# Patient Record
Sex: FEMALE | Race: WHITE | NOT HISPANIC OR LATINO | Employment: STUDENT | URBAN - METROPOLITAN AREA
[De-identification: names, ages, dates, MRNs, and addresses within clinical notes are randomized per-mention and may not be internally consistent; named-entity substitution may affect disease eponyms.]

---

## 2020-07-14 DIAGNOSIS — N94.6 DYSMENORRHEA: Primary | ICD-10-CM

## 2020-09-10 ENCOUNTER — OFFICE VISIT (OUTPATIENT)
Dept: OBSTETRICS AND GYNECOLOGY | Facility: CLINIC | Age: 20
End: 2020-09-10
Payer: COMMERCIAL

## 2020-09-10 VITALS
HEART RATE: 80 BPM | HEIGHT: 69 IN | DIASTOLIC BLOOD PRESSURE: 74 MMHG | SYSTOLIC BLOOD PRESSURE: 113 MMHG | BODY MASS INDEX: 25.33 KG/M2 | WEIGHT: 171 LBS

## 2020-09-10 DIAGNOSIS — N94.6 DYSMENORRHEA: ICD-10-CM

## 2020-09-10 DIAGNOSIS — Z01.419 WOMEN'S ANNUAL ROUTINE GYNECOLOGICAL EXAMINATION: Primary | ICD-10-CM

## 2020-09-10 PROCEDURE — 99385 PREV VISIT NEW AGE 18-39: CPT | Mod: S$GLB,,, | Performed by: OBSTETRICS & GYNECOLOGY

## 2020-09-10 PROCEDURE — 99385 PR PREVENTIVE VISIT,NEW,18-39: ICD-10-PCS | Mod: S$GLB,,, | Performed by: OBSTETRICS & GYNECOLOGY

## 2020-09-10 RX ORDER — ELUXADOLINE 100 MG/1
1 TABLET, FILM COATED ORAL EVERY MORNING
COMMUNITY
Start: 2020-08-04 | End: 2022-01-04 | Stop reason: SDUPTHER

## 2020-09-10 RX ORDER — ESCITALOPRAM OXALATE 10 MG/1
10 TABLET ORAL DAILY
COMMUNITY
Start: 2020-07-13 | End: 2021-09-02

## 2020-09-10 RX ORDER — DESOGESTREL AND ETHINYL ESTRADIOL 0.15-0.03
1 KIT ORAL DAILY
COMMUNITY
Start: 2020-08-13 | End: 2020-09-10

## 2020-09-10 RX ORDER — LEVONORGESTREL AND ETHINYL ESTRADIOL 0.1-0.02MG
1 KIT ORAL DAILY
Qty: 28 TABLET | Refills: 11 | Status: SHIPPED | OUTPATIENT
Start: 2020-09-10 | End: 2021-09-02

## 2020-09-10 NOTE — PROGRESS NOTES
Subjective:       Patient ID: Sophia Canseco is a 19 y.o. female.    Chief Complaint:  Well Woman, Gynecologic Exam, and Contraception      History of Present Illness  Pt here for gyn annual.  History and past labs reviewed with patient.    Complaints dysmenorrhea with cycle. Also notes that cycle has moved to the active pills of her pack. Wants to discuss changing contraception       Past Medical History:   Diagnosis Date    Anxiety     IBS (irritable bowel syndrome)        Past Surgical History:   Procedure Laterality Date    COLONOSCOPY         OB:    Gyn: no STD, never had abn pap   Meds:   Current Outpatient Medications:     ENSKYCE 0.15-0.03 mg per tablet, Take 1 tablet by mouth once daily., Disp: , Rfl:     escitalopram oxalate (LEXAPRO) 10 MG tablet, Take 10 mg by mouth once daily., Disp: , Rfl:     VIBERZI 100 mg Tab, Take 1 tablet by mouth every morning., Disp: , Rfl:     All: Review of patient's allergies indicates:  No Known Allergies    SH:   Social History     Tobacco Use    Smoking status: Never Smoker    Smokeless tobacco: Never Used   Substance Use Topics    Alcohol use: Yes     Frequency: 2-4 times a month      FH: family history includes Diabetes in her paternal grandfather.          Review of Systems  Review of Systems   Constitutional: Negative for chills and fever.   Respiratory: Negative for shortness of breath.    Cardiovascular: Negative for chest pain.   Gastrointestinal: Negative for abdominal pain, blood in stool, constipation, diarrhea, nausea, vomiting and reflux.   Genitourinary: Positive for dysmenorrhea. Negative for dyspareunia, dysuria, hematuria, hot flashes, menorrhagia, menstrual problem, pelvic pain, vaginal bleeding, vaginal discharge, postcoital bleeding and vaginal dryness.   Musculoskeletal: Negative for arthralgias and joint swelling.   Integumentary:  Negative for rash, hair changes, breast mass, nipple discharge and breast skin changes.  "  Psychiatric/Behavioral: Negative for depression. The patient is not nervous/anxious.    Breast: Negative for asymmetry, lump, mass, nipple discharge and skin changes          Objective:     Vitals:    09/10/20 1321   BP: 113/74   Pulse: 80   Weight: 77.6 kg (171 lb)   Height: 5' 9" (1.753 m)       Physical Exam:   Constitutional: She appears well-developed and well-nourished. No distress.    HENT:   Head: Normocephalic and atraumatic.    Eyes: Conjunctivae and EOM are normal.    Neck: No tracheal deviation present. No thyromegaly present.    Cardiovascular: Exam reveals no clubbing, no cyanosis and no edema.     Pulmonary/Chest: Effort normal. No respiratory distress.        Abdominal: Soft. She exhibits no distension and no mass. There is no abdominal tenderness. There is no rebound and no guarding. No hernia.     Genitourinary:    Vagina, uterus and rectum normal.      Pelvic exam was performed with patient supine.   There is no rash, tenderness, lesion or injury on the right labia. There is no rash, tenderness, lesion or injury on the left labia. Cervix is normal. Right adnexum displays no mass, no tenderness and no fullness. Left adnexum displays no mass, no tenderness and no fullness.                Skin: She is not diaphoretic. No cyanosis. Nails show no clubbing.          Assessment:        1. Women's annual routine gynecological examination                Plan:      Annual   Pap today   STD panel requested   Switch to balcoltra due to progesterone type   Risk assessment for inherited gyn cancer done - neg   RTC  1 year         "

## 2020-09-10 NOTE — LETTER
September 10, 2020      Antonina Sinha MD  [address]  [address]  [place] [address]           [facility]  [address]  [place] [address]  Phone: [phone]  Fax: [phone]          Patient: Sophia Canseco   MR Number: [id number]   YOB: 2000   Date of Visit: 9/10/2020       Dear Dr. Antonina Sinha:    Thank you for referring Sophia Canseco to me for evaluation. Attached you will find relevant portions of my assessment and plan of care.    If you have questions, please do not hesitate to call me. I look forward to following Sophia Canseco along with you.    Sincerely,    Gemma Graves MD    Enclosure  CC:  No Recipients    If you would like to receive this communication electronically, please contact [email or web address] or [phone] to request more information on [facility] [facility] access.    For providers and/or their staff who would like to refer a patient to [facility], please contact us through our one-stop-shop provider referral line, [facility], at [phone].    If you feel you have received this communication in error or would no longer like to receive these types of communications, please e-mail [email or web address]

## 2021-04-27 ENCOUNTER — TELEPHONE (OUTPATIENT)
Dept: OBSTETRICS AND GYNECOLOGY | Facility: CLINIC | Age: 21
End: 2021-04-27

## 2021-09-02 ENCOUNTER — OFFICE VISIT (OUTPATIENT)
Dept: OBSTETRICS AND GYNECOLOGY | Facility: CLINIC | Age: 21
End: 2021-09-02
Payer: COMMERCIAL

## 2021-09-02 VITALS
WEIGHT: 181.81 LBS | BODY MASS INDEX: 26.93 KG/M2 | HEIGHT: 69 IN | HEART RATE: 91 BPM | SYSTOLIC BLOOD PRESSURE: 109 MMHG | DIASTOLIC BLOOD PRESSURE: 73 MMHG

## 2021-09-02 DIAGNOSIS — N94.6 DYSMENORRHEA: Primary | ICD-10-CM

## 2021-09-02 DIAGNOSIS — Z30.011 ENCOUNTER FOR PRESCRIPTION OF ORAL CONTRACEPTIVES: ICD-10-CM

## 2021-09-02 PROCEDURE — 99213 PR OFFICE/OUTPT VISIT, EST, LEVL III, 20-29 MIN: ICD-10-PCS | Mod: S$GLB,,, | Performed by: OBSTETRICS & GYNECOLOGY

## 2021-09-02 PROCEDURE — 3008F BODY MASS INDEX DOCD: CPT | Mod: CPTII,S$GLB,, | Performed by: OBSTETRICS & GYNECOLOGY

## 2021-09-02 PROCEDURE — 99213 OFFICE O/P EST LOW 20 MIN: CPT | Mod: S$GLB,,, | Performed by: OBSTETRICS & GYNECOLOGY

## 2021-09-02 PROCEDURE — 1159F PR MEDICATION LIST DOCUMENTED IN MEDICAL RECORD: ICD-10-PCS | Mod: CPTII,S$GLB,, | Performed by: OBSTETRICS & GYNECOLOGY

## 2021-09-02 PROCEDURE — 3074F SYST BP LT 130 MM HG: CPT | Mod: CPTII,S$GLB,, | Performed by: OBSTETRICS & GYNECOLOGY

## 2021-09-02 PROCEDURE — 3078F DIAST BP <80 MM HG: CPT | Mod: CPTII,S$GLB,, | Performed by: OBSTETRICS & GYNECOLOGY

## 2021-09-02 PROCEDURE — 1159F MED LIST DOCD IN RCRD: CPT | Mod: CPTII,S$GLB,, | Performed by: OBSTETRICS & GYNECOLOGY

## 2021-09-02 PROCEDURE — 3078F PR MOST RECENT DIASTOLIC BLOOD PRESSURE < 80 MM HG: ICD-10-PCS | Mod: CPTII,S$GLB,, | Performed by: OBSTETRICS & GYNECOLOGY

## 2021-09-02 PROCEDURE — 3008F PR BODY MASS INDEX (BMI) DOCUMENTED: ICD-10-PCS | Mod: CPTII,S$GLB,, | Performed by: OBSTETRICS & GYNECOLOGY

## 2021-09-02 PROCEDURE — 3074F PR MOST RECENT SYSTOLIC BLOOD PRESSURE < 130 MM HG: ICD-10-PCS | Mod: CPTII,S$GLB,, | Performed by: OBSTETRICS & GYNECOLOGY

## 2021-09-02 RX ORDER — DROSPIRENONE AND ETHINYL ESTRADIOL 0.02-3(28)
1 KIT ORAL DAILY
Qty: 30 TABLET | Refills: 11 | Status: SHIPPED | OUTPATIENT
Start: 2021-09-02 | End: 2021-12-03 | Stop reason: SDUPTHER

## 2021-12-03 ENCOUNTER — OFFICE VISIT (OUTPATIENT)
Dept: OBSTETRICS AND GYNECOLOGY | Facility: CLINIC | Age: 21
End: 2021-12-03
Payer: COMMERCIAL

## 2021-12-03 VITALS
BODY MASS INDEX: 25.84 KG/M2 | HEART RATE: 86 BPM | DIASTOLIC BLOOD PRESSURE: 72 MMHG | SYSTOLIC BLOOD PRESSURE: 109 MMHG | WEIGHT: 175 LBS

## 2021-12-03 DIAGNOSIS — Z01.419 WOMEN'S ANNUAL ROUTINE GYNECOLOGICAL EXAMINATION: Primary | ICD-10-CM

## 2021-12-03 DIAGNOSIS — N94.6 DYSMENORRHEA: ICD-10-CM

## 2021-12-03 PROCEDURE — 99395 PREV VISIT EST AGE 18-39: CPT | Mod: S$GLB,,, | Performed by: OBSTETRICS & GYNECOLOGY

## 2021-12-03 PROCEDURE — 99395 PR PREVENTIVE VISIT,EST,18-39: ICD-10-PCS | Mod: S$GLB,,, | Performed by: OBSTETRICS & GYNECOLOGY

## 2021-12-03 RX ORDER — FLUOXETINE 10 MG/1
CAPSULE ORAL
COMMUNITY
Start: 2021-10-25

## 2021-12-03 RX ORDER — DICYCLOMINE HYDROCHLORIDE 10 MG/1
CAPSULE ORAL
COMMUNITY
Start: 2021-11-19 | End: 2022-04-13 | Stop reason: SDUPTHER

## 2021-12-03 RX ORDER — PROPRANOLOL HYDROCHLORIDE 10 MG/1
TABLET ORAL
COMMUNITY
Start: 2021-10-01

## 2021-12-03 RX ORDER — DROSPIRENONE AND ETHINYL ESTRADIOL 0.02-3(28)
1 KIT ORAL DAILY
Qty: 30 TABLET | Refills: 11 | Status: SHIPPED | OUTPATIENT
Start: 2021-12-03 | End: 2023-01-18 | Stop reason: SDUPTHER

## 2022-01-04 ENCOUNTER — OFFICE VISIT (OUTPATIENT)
Dept: GASTROENTEROLOGY | Facility: CLINIC | Age: 22
End: 2022-01-04
Payer: COMMERCIAL

## 2022-01-04 VITALS
TEMPERATURE: 99 F | OXYGEN SATURATION: 98 % | DIASTOLIC BLOOD PRESSURE: 84 MMHG | WEIGHT: 179.38 LBS | HEIGHT: 70 IN | SYSTOLIC BLOOD PRESSURE: 126 MMHG | HEART RATE: 84 BPM | BODY MASS INDEX: 25.68 KG/M2

## 2022-01-04 DIAGNOSIS — K58.0 IRRITABLE BOWEL SYNDROME WITH DIARRHEA: Primary | ICD-10-CM

## 2022-01-04 PROCEDURE — 99203 PR OFFICE/OUTPT VISIT, NEW, LEVL III, 30-44 MIN: ICD-10-PCS | Mod: S$GLB,,, | Performed by: INTERNAL MEDICINE

## 2022-01-04 PROCEDURE — 3008F BODY MASS INDEX DOCD: CPT | Mod: CPTII,S$GLB,, | Performed by: INTERNAL MEDICINE

## 2022-01-04 PROCEDURE — 3008F PR BODY MASS INDEX (BMI) DOCUMENTED: ICD-10-PCS | Mod: CPTII,S$GLB,, | Performed by: INTERNAL MEDICINE

## 2022-01-04 PROCEDURE — 1159F PR MEDICATION LIST DOCUMENTED IN MEDICAL RECORD: ICD-10-PCS | Mod: CPTII,S$GLB,, | Performed by: INTERNAL MEDICINE

## 2022-01-04 PROCEDURE — 3079F DIAST BP 80-89 MM HG: CPT | Mod: CPTII,S$GLB,, | Performed by: INTERNAL MEDICINE

## 2022-01-04 PROCEDURE — 3079F PR MOST RECENT DIASTOLIC BLOOD PRESSURE 80-89 MM HG: ICD-10-PCS | Mod: CPTII,S$GLB,, | Performed by: INTERNAL MEDICINE

## 2022-01-04 PROCEDURE — 1160F PR REVIEW ALL MEDS BY PRESCRIBER/CLIN PHARMACIST DOCUMENTED: ICD-10-PCS | Mod: CPTII,S$GLB,, | Performed by: INTERNAL MEDICINE

## 2022-01-04 PROCEDURE — 3074F SYST BP LT 130 MM HG: CPT | Mod: CPTII,S$GLB,, | Performed by: INTERNAL MEDICINE

## 2022-01-04 PROCEDURE — 1159F MED LIST DOCD IN RCRD: CPT | Mod: CPTII,S$GLB,, | Performed by: INTERNAL MEDICINE

## 2022-01-04 PROCEDURE — 3074F PR MOST RECENT SYSTOLIC BLOOD PRESSURE < 130 MM HG: ICD-10-PCS | Mod: CPTII,S$GLB,, | Performed by: INTERNAL MEDICINE

## 2022-01-04 PROCEDURE — 1160F RVW MEDS BY RX/DR IN RCRD: CPT | Mod: CPTII,S$GLB,, | Performed by: INTERNAL MEDICINE

## 2022-01-04 PROCEDURE — 99203 OFFICE O/P NEW LOW 30 MIN: CPT | Mod: S$GLB,,, | Performed by: INTERNAL MEDICINE

## 2022-01-04 RX ORDER — SPIRONOLACTONE 50 MG/1
1 TABLET, FILM COATED ORAL DAILY
COMMUNITY
Start: 2021-12-29

## 2022-01-04 RX ORDER — ELUXADOLINE 100 MG/1
1 TABLET, FILM COATED ORAL EVERY MORNING
Qty: 90 TABLET | Refills: 4 | Status: SHIPPED | OUTPATIENT
Start: 2022-01-04 | End: 2022-01-24 | Stop reason: SDUPTHER

## 2022-01-04 RX ORDER — TRETINOIN 0.6 MG/G
1 GEL TOPICAL DAILY PRN
COMMUNITY
Start: 2021-12-29 | End: 2023-10-12

## 2022-01-04 NOTE — LETTER
January 4, 2022        Antonina Sinha MD  [address]  [address]  [place] [address]             [facility]  [address] [address]  Phone: [phone]  Fax: [phone]   Patient: Sophia Canseco   MR Number: [id number]   YOB: 2000   Date of Visit: 1/4/2022       Dear Dr. Sinha:    Thank you for referring Sophia Canseco to me for evaluation. Attached you will find relevant portions of my assessment and plan of care.    If you have questions, please do not hesitate to call me. I look forward to following Sophia Canseco along with you.    Sincerely,      Cristina Grayson MD            CC  No Recipients    Enclosure

## 2022-01-04 NOTE — PROGRESS NOTES
Clinic Note    Reason for visit:  The encounter diagnosis was Irritable bowel syndrome with diarrhea.    PCP: Antonina Sinha       HPI:  This is a 21 y.o. female with complaints of loose stool in the morning, fatigue weak.  Had more constipation in early teens. Then switched more to diarrhea ~senior year of high school, ~2016.  Bharath tried Rx meds without help. Does not know the names. Has not tried OTC meds.  Will get cramping pre-BMs.  Takes Bentyl 10 PRN in the morning.  May not take Viberzi on the weekends.  No nighttime BM. No blood in stool.  On Fluoxetine x3 months, helped her GI issues.  Father and sister have GI Sx similar to her.    Had colonoscopy by Archie and told IBS-D.  Viberzi 100 daily, some days thinks it is too much.    E/C ~2016.    ROS:  Review of Systems   Constitutional: Negative for chills, diaphoresis, fatigue, fever and unexpected weight change.   HENT: Negative for nasal congestion, ear pain, facial swelling, hearing loss, mouth dryness, mouth sores, nosebleeds, postnasal drip, sore throat, tinnitus, trouble swallowing and voice change.    Eyes: Negative for pain, discharge, redness and eye dryness.   Respiratory: Negative for apnea, cough, choking, chest tightness, shortness of breath, wheezing and stridor.    Cardiovascular: Negative for chest pain, palpitations, leg swelling and claudication.   Gastrointestinal: Positive for abdominal pain and diarrhea. Negative for abdominal distention, anal bleeding, blood in stool, change in bowel habit, constipation, nausea, rectal pain, vomiting, reflux, fecal incontinence and change in bowel habit.   Endocrine: Negative for cold intolerance, heat intolerance, polydipsia and polyuria.   Genitourinary: Negative for bladder incontinence, difficulty urinating, dysuria, flank pain, frequency, hematuria, nocturia and urgency.   Musculoskeletal: Negative for arthralgias, back pain, gait problem, joint swelling and joint deformity.   Integumentary:   "Negative for color change, pallor, rash and wound.   Allergic/Immunologic: Negative for environmental allergies, food allergies and immunocompromised state.   Neurological: Negative for dizziness, vertigo, tremors, seizures, syncope, facial asymmetry, speech difficulty, weakness, numbness, headaches and memory loss.   Hematological: Negative for adenopathy. Does not bruise/bleed easily.   Psychiatric/Behavioral: Negative for agitation, behavioral problems, confusion, hallucinations and sleep disturbance. The patient is not nervous/anxious.         Medical History:   Past Medical History:   Diagnosis Date    Anxiety     IBS (irritable bowel syndrome)        Surgical History:  Past Surgical History:   Procedure Laterality Date    COLONOSCOPY  2016       Family History:   Family History   Problem Relation Age of Onset    Diabetes Paternal Grandfather        Social History:   Social History     Tobacco Use    Smoking status: Never Smoker    Smokeless tobacco: Never Used   Substance Use Topics    Alcohol use: Yes     Comment: socially     Drug use: Never       Allergies: Review of patient's allergies indicates:  No Known Allergies     Home Medications:   Medication List with Changes/Refills   New Medications    RIFAXIMIN (XIFAXAN) 550 MG TAB    Take 1 tablet (550 mg total) by mouth 3 (three) times daily. for 14 days   Current Medications    DICYCLOMINE (BENTYL) 10 MG CAPSULE        DROSPIRENONE-ETHINYL ESTRADIOL (TONY) 3-0.02 MG PER TABLET    Take 1 tablet by mouth once daily.    FLUOXETINE 10 MG CAPSULE        PROPRANOLOL (INDERAL) 10 MG TABLET        RETIN-A MICRO PUMP 0.06 % GLWP    Apply 1 Pump topically daily as needed.    SPIRONOLACTONE (ALDACTONE) 50 MG TABLET    Take 1 tablet by mouth once daily.    VIBERZI 100 MG TAB    Take 1 tablet by mouth every morning.         Physical Exam:  Vital Signs:  /84   Pulse 84   Temp 98.5 °F (36.9 °C)   Ht 5' 10" (1.778 m)   Wt 81.4 kg (179 lb 6.4 oz)   SpO2 98% "   BMI 25.74 kg/m²   Body mass index is 25.74 kg/m².      Physical Exam  Vitals reviewed.   Constitutional:       General: She is awake. She is not in acute distress.     Appearance: Normal appearance. She is well-developed. She is not ill-appearing, toxic-appearing or diaphoretic.   HENT:      Head: Normocephalic and atraumatic.      Nose: Nose normal.      Mouth/Throat:      Mouth: Mucous membranes are moist.      Pharynx: Oropharynx is clear. No oropharyngeal exudate or posterior oropharyngeal erythema.   Eyes:      General: Lids are normal. Gaze aligned appropriately. No scleral icterus.        Right eye: No discharge.         Left eye: No discharge.      Extraocular Movements: Extraocular movements intact.      Conjunctiva/sclera: Conjunctivae normal.   Neck:      Trachea: Trachea normal.   Cardiovascular:      Rate and Rhythm: Normal rate and regular rhythm.      Pulses:           Radial pulses are 2+ on the right side and 2+ on the left side.   Pulmonary:      Effort: Pulmonary effort is normal. No respiratory distress.      Breath sounds: Normal breath sounds. No stridor. No wheezing or rhonchi.   Chest:      Chest wall: No tenderness.   Abdominal:      General: Abdomen is flat. Bowel sounds are normal. There is no distension.      Palpations: Abdomen is soft. There is no fluid wave, hepatomegaly or mass.      Tenderness: There is no abdominal tenderness. There is no guarding or rebound.   Musculoskeletal:         General: No tenderness or deformity.      Cervical back: Full passive range of motion without pain and neck supple. No tenderness.      Right lower leg: No edema.      Left lower leg: No edema.   Lymphadenopathy:      Cervical: No cervical adenopathy.   Skin:     General: Skin is warm and dry.      Capillary Refill: Capillary refill takes less than 2 seconds.      Coloration: Skin is not cyanotic, jaundiced or pale.      Findings: No rash.   Neurological:      General: No focal deficit present.       Mental Status: She is alert and oriented to person, place, and time.      Cranial Nerves: No facial asymmetry.      Motor: No tremor.   Psychiatric:         Attention and Perception: Attention normal.         Mood and Affect: Mood and affect normal.         Speech: Speech normal.         Behavior: Behavior normal. Behavior is cooperative.          Labs: Pertinent labs reviewed.       Assessment:  Irritable bowel syndrome with diarrhea    Other orders  -     rifAXIMin (XIFAXAN) 550 mg Tab; Take 1 tablet (550 mg total) by mouth 3 (three) times daily. for 14 days  Dispense: 42 tablet; Refill: 0         Recommendations:  Begin fiber supplements. Two daily then increase every 2 weeks.  Take Xifaxan course (use Xifaxan.com for Savings Plan).  Then may try stopping Viberzi but resume if needed. May continue dicyclomine and liberate its use if needed.  Call with any issues.    Follow up in about 3 months (around 4/4/2022).    Order summary:  No orders of the defined types were placed in this encounter.      Thank you so much for allowing me to participate in the care of Sophia Grayson MD

## 2022-01-04 NOTE — PATIENT INSTRUCTIONS
Begin fiber supplements. Two daily then increase every 2 weeks.  Take Xifaxan course (use [email or web address].com for Savings Plan).  Then may try stopping Viberzi but resume if needed. May continue dicyclomine and liberate its use if needed.  Call with any issues.

## 2022-01-04 NOTE — PROGRESS NOTES
Subjective:       Patient ID: Sophia Canseco is a 21 y.o. female.    Chief Complaint: No chief complaint on file.    HPI  Review of Systems   Constitutional: Negative for chills, diaphoresis, fatigue, fever and unexpected weight change.   HENT: Negative for nasal congestion, ear pain, facial swelling, hearing loss, mouth dryness, mouth sores, nosebleeds, postnasal drip, sore throat, tinnitus, trouble swallowing and voice change.    Eyes: Negative for pain, discharge, redness and eye dryness.   Respiratory: Negative for apnea, cough, choking, chest tightness, shortness of breath, wheezing and stridor.    Cardiovascular: Negative for chest pain, palpitations, leg swelling and claudication.   Gastrointestinal: Negative for abdominal distention, abdominal pain, anal bleeding, blood in stool, change in bowel habit, constipation, diarrhea, nausea, rectal pain, vomiting, reflux, fecal incontinence and change in bowel habit.   Endocrine: Negative for cold intolerance, heat intolerance, polydipsia and polyuria.   Genitourinary: Negative for bladder incontinence, difficulty urinating, dysuria, flank pain, frequency, hematuria, nocturia and urgency.   Musculoskeletal: Negative for arthralgias, back pain, gait problem, joint swelling and joint deformity.   Integumentary:  Negative for color change, pallor, rash and wound.   Allergic/Immunologic: Negative for environmental allergies, food allergies and immunocompromised state.   Neurological: Negative for dizziness, vertigo, tremors, seizures, syncope, facial asymmetry, speech difficulty, weakness, numbness, headaches and memory loss.   Hematological: Negative for adenopathy. Does not bruise/bleed easily.   Psychiatric/Behavioral: Negative for agitation, behavioral problems, confusion, hallucinations and sleep disturbance. The patient is not nervous/anxious.          Objective:      Physical Exam  Vitals reviewed.   Constitutional:       General: She is awake. She is not in acute  distress.     Appearance: Normal appearance. She is well-developed. She is not ill-appearing, toxic-appearing or diaphoretic.   HENT:      Head: Normocephalic and atraumatic.      Nose: Nose normal.      Mouth/Throat:      Mouth: Mucous membranes are moist.      Pharynx: Oropharynx is clear. No oropharyngeal exudate or posterior oropharyngeal erythema.   Eyes:      General: Lids are normal. Gaze aligned appropriately. No scleral icterus.        Right eye: No discharge.         Left eye: No discharge.      Extraocular Movements: Extraocular movements intact.      Conjunctiva/sclera: Conjunctivae normal.   Neck:      Trachea: Trachea normal.   Cardiovascular:      Rate and Rhythm: Normal rate and regular rhythm.      Pulses:           Radial pulses are 2+ on the right side and 2+ on the left side.   Pulmonary:      Effort: Pulmonary effort is normal. No respiratory distress.      Breath sounds: Normal breath sounds. No stridor. No wheezing or rhonchi.   Chest:      Chest wall: No tenderness.   Abdominal:      General: Abdomen is flat. Bowel sounds are normal. There is no distension.      Palpations: Abdomen is soft. There is no fluid wave, hepatomegaly or mass.      Tenderness: There is no abdominal tenderness. There is no guarding or rebound.   Musculoskeletal:         General: No tenderness or deformity.      Cervical back: Full passive range of motion without pain and neck supple. No tenderness.      Right lower leg: No edema.      Left lower leg: No edema.   Lymphadenopathy:      Cervical: No cervical adenopathy.   Skin:     General: Skin is warm and dry.      Capillary Refill: Capillary refill takes less than 2 seconds.      Coloration: Skin is not cyanotic, jaundiced or pale.      Findings: No rash.   Neurological:      General: No focal deficit present.      Mental Status: She is alert and oriented to person, place, and time.      Cranial Nerves: No facial asymmetry.      Motor: No tremor.   Psychiatric:          Attention and Perception: Attention normal.         Mood and Affect: Mood and affect normal.         Speech: Speech normal.         Behavior: Behavior normal. Behavior is cooperative.         Assessment:       Problem List Items Addressed This Visit    None         Plan:       ***    No diagnosis found.

## 2022-01-07 NOTE — TELEPHONE ENCOUNTER
Xifaxan cost-prohibitive even with co-pay card at his point in the year. Do we have three days' worth of samples to provide the patient?  NBP

## 2022-01-10 ENCOUNTER — TELEPHONE (OUTPATIENT)
Dept: GASTROENTEROLOGY | Facility: CLINIC | Age: 22
End: 2022-01-10
Payer: COMMERCIAL

## 2022-01-10 NOTE — TELEPHONE ENCOUNTER
Options include seeing how she responds to the fiber titration for now, she picks up 3-days of Xifaxan samples, and/or we try later in the year when her Rx out-of-pocket is closer to being met.

## 2022-01-10 NOTE — TELEPHONE ENCOUNTER
----- Message from Chetna Hawthorne MA sent at 1/10/2022  1:48 PM CST -----  Regarding: FW: medication cost    ----- Message -----  From: Vale Swanson MA  Sent: 1/10/2022   8:38 AM CST  To: Chetna Hawthorne MA  Subject: medication cost                                  Pt called to let you know Xifaxin is still $800 w/ coupon. Is there another option for her?

## 2022-01-11 RX ORDER — RIFAXIMIN 550 MG/1
TABLET ORAL
Qty: 42 TABLET | Refills: 0 | OUTPATIENT
Start: 2022-01-11

## 2022-01-21 DIAGNOSIS — K58.0 IRRITABLE BOWEL SYNDROME WITH DIARRHEA: Primary | ICD-10-CM

## 2022-01-21 NOTE — TELEPHONE ENCOUNTER
----- Message from Vale Swanson MA sent at 1/19/2022  3:50 PM CST -----    ----- Message -----  From: Noreen Duncan  Sent: 1/19/2022   1:33 PM CST  To: Kenan MEEHAN Staff    Sophia Canseco stated that she completed the trial pack of medication she was given to Dr Grayson and is calling to update the nurse on how it worked for her. Please give her a call back at [phone].

## 2022-01-21 NOTE — TELEPHONE ENCOUNTER
I appreciate her trying the Xifaxan to see if it would help minimize her other medication use. I would still plan on the fiber supplementation and she can continue the Viberzi as she was with Bentyl.  ALFONZO

## 2022-01-21 NOTE — TELEPHONE ENCOUNTER
I spoke with the patient who took xifaxan tid x3 days. She reports that she had several episodes even while taking the medicine, so she does not believe it did anything for her.  KDL, CMA

## 2022-01-24 RX ORDER — ELUXADOLINE 100 MG/1
1 TABLET, FILM COATED ORAL EVERY MORNING
Qty: 90 TABLET | Refills: 4 | Status: SHIPPED | OUTPATIENT
Start: 2022-01-24 | End: 2022-08-08 | Stop reason: DRUGHIGH

## 2022-04-13 ENCOUNTER — OFFICE VISIT (OUTPATIENT)
Dept: GASTROENTEROLOGY | Facility: CLINIC | Age: 22
End: 2022-04-13
Payer: COMMERCIAL

## 2022-04-13 VITALS
BODY MASS INDEX: 26.83 KG/M2 | WEIGHT: 181.13 LBS | HEIGHT: 69 IN | SYSTOLIC BLOOD PRESSURE: 124 MMHG | DIASTOLIC BLOOD PRESSURE: 81 MMHG | HEART RATE: 78 BPM | OXYGEN SATURATION: 98 %

## 2022-04-13 DIAGNOSIS — K52.9 CHRONIC DIARRHEA: ICD-10-CM

## 2022-04-13 DIAGNOSIS — R14.0 BLOATING: ICD-10-CM

## 2022-04-13 DIAGNOSIS — K58.0 IRRITABLE BOWEL SYNDROME WITH DIARRHEA: Primary | ICD-10-CM

## 2022-04-13 PROCEDURE — 3074F SYST BP LT 130 MM HG: CPT | Mod: CPTII,S$GLB,, | Performed by: INTERNAL MEDICINE

## 2022-04-13 PROCEDURE — 1159F PR MEDICATION LIST DOCUMENTED IN MEDICAL RECORD: ICD-10-PCS | Mod: CPTII,S$GLB,, | Performed by: INTERNAL MEDICINE

## 2022-04-13 PROCEDURE — 1160F PR REVIEW ALL MEDS BY PRESCRIBER/CLIN PHARMACIST DOCUMENTED: ICD-10-PCS | Mod: CPTII,S$GLB,, | Performed by: INTERNAL MEDICINE

## 2022-04-13 PROCEDURE — 1160F RVW MEDS BY RX/DR IN RCRD: CPT | Mod: CPTII,S$GLB,, | Performed by: INTERNAL MEDICINE

## 2022-04-13 PROCEDURE — 3008F PR BODY MASS INDEX (BMI) DOCUMENTED: ICD-10-PCS | Mod: CPTII,S$GLB,, | Performed by: INTERNAL MEDICINE

## 2022-04-13 PROCEDURE — 3079F PR MOST RECENT DIASTOLIC BLOOD PRESSURE 80-89 MM HG: ICD-10-PCS | Mod: CPTII,S$GLB,, | Performed by: INTERNAL MEDICINE

## 2022-04-13 PROCEDURE — 99213 PR OFFICE/OUTPT VISIT, EST, LEVL III, 20-29 MIN: ICD-10-PCS | Mod: S$GLB,,, | Performed by: INTERNAL MEDICINE

## 2022-04-13 PROCEDURE — 3074F PR MOST RECENT SYSTOLIC BLOOD PRESSURE < 130 MM HG: ICD-10-PCS | Mod: CPTII,S$GLB,, | Performed by: INTERNAL MEDICINE

## 2022-04-13 PROCEDURE — 99213 OFFICE O/P EST LOW 20 MIN: CPT | Mod: S$GLB,,, | Performed by: INTERNAL MEDICINE

## 2022-04-13 PROCEDURE — 3079F DIAST BP 80-89 MM HG: CPT | Mod: CPTII,S$GLB,, | Performed by: INTERNAL MEDICINE

## 2022-04-13 PROCEDURE — 3008F BODY MASS INDEX DOCD: CPT | Mod: CPTII,S$GLB,, | Performed by: INTERNAL MEDICINE

## 2022-04-13 PROCEDURE — 1159F MED LIST DOCD IN RCRD: CPT | Mod: CPTII,S$GLB,, | Performed by: INTERNAL MEDICINE

## 2022-04-13 RX ORDER — DICYCLOMINE HYDROCHLORIDE 10 MG/1
10 CAPSULE ORAL 2 TIMES DAILY PRN
Qty: 100 CAPSULE | Refills: 1 | Status: SHIPPED | OUTPATIENT
Start: 2022-04-13 | End: 2022-04-20

## 2022-04-13 NOTE — LETTER
April 13, 2022        Antonina Sinha MD  [address]  [address]  [place] [address]             [facility]  [address] [address]  Phone: [phone]  Fax: [phone]   Patient: Sophia Canseco   MR Number: [id number]   YOB: 2000   Date of Visit: 4/13/2022       Dear Dr. Sinha:    Thank you for referring Sophia Canseco to me for evaluation. Attached you will find relevant portions of my assessment and plan of care.    If you have questions, please do not hesitate to call me. I look forward to following Sophia Canseco along with you.    Sincerely,      Cristina Grayson MD            CC  No Recipients    Enclosure

## 2022-04-13 NOTE — PROGRESS NOTES
Clinic Note    Reason for visit:  The primary encounter diagnosis was Irritable bowel syndrome with diarrhea. Diagnoses of Bloating and Chronic diarrhea were also pertinent to this visit.    PCP: Antonina Sinha       HPI:  This is a 21 y.o. female who is being seen for follow-up visit.   Patient with IBS -D.  Trial of Xifaxan samples for 3 days due to out-of-pocket cost being too high, patient took TID for 3 days and it did not help with diarrhea episodes. She is taking Viberzi daily to control diarrhea. Also trying fiber supplements BID. She states she is not having diarrhea episodes but now around lunch time each day she is having cramps and bloating/gas. She bloating is fairly new symptom and her main concern. She does take dicyclomine as needed for cramping and has been taking it in the morning and around lunch time lately. Dicyclomine helps the cramping. No blood in stool. No prior EGD. Has not been tested for Celiac.       Review of Systems   Constitutional: Negative for chills, diaphoresis, fatigue, fever and unexpected weight change.   HENT: Negative for mouth sores, nosebleeds, postnasal drip, sore throat, trouble swallowing and voice change.    Eyes: Negative for pain, discharge and eye dryness.   Respiratory: Negative for apnea, cough, choking, chest tightness, shortness of breath and wheezing.    Cardiovascular: Negative for chest pain, palpitations, leg swelling and claudication.   Gastrointestinal: Negative for abdominal distention, abdominal pain, anal bleeding, blood in stool, change in bowel habit, constipation, diarrhea, nausea, rectal pain, vomiting, reflux, fecal incontinence and change in bowel habit.   Genitourinary: Negative for bladder incontinence, difficulty urinating, dysuria, flank pain, frequency and hematuria.   Musculoskeletal: Negative for arthralgias, back pain, joint swelling and joint deformity.   Integumentary:  Negative for color change, rash and wound.  "  Allergic/Immunologic: Positive for environmental allergies. Negative for food allergies.   Neurological: Negative for seizures, facial asymmetry, speech difficulty, weakness, headaches and memory loss.   Hematological: Negative for adenopathy. Does not bruise/bleed easily.   Psychiatric/Behavioral: Negative for agitation, behavioral problems, confusion, hallucinations and sleep disturbance.      Past Medical History:   Diagnosis Date    Anxiety     IBS (irritable bowel syndrome)      Past Surgical History:   Procedure Laterality Date    COLONOSCOPY  2016     Family History   Problem Relation Age of Onset    Diabetes Paternal Grandfather      Social History     Tobacco Use    Smoking status: Never Smoker    Smokeless tobacco: Never Used   Substance Use Topics    Alcohol use: Yes     Comment: socially     Drug use: Never     Review of patient's allergies indicates:  No Known Allergies   Medication List with Changes/Refills   Current Medications    DROSPIRENONE-ETHINYL ESTRADIOL (TONY) 3-0.02 MG PER TABLET    Take 1 tablet by mouth once daily.    FLUOXETINE 10 MG CAPSULE        PROPRANOLOL (INDERAL) 10 MG TABLET        RETIN-A MICRO PUMP 0.06 % GLWP    Apply 1 Pump topically daily as needed.    SPIRONOLACTONE (ALDACTONE) 50 MG TABLET    Take 1 tablet by mouth once daily.    VIBERZI 100 MG TAB    Take 1 tablet by mouth every morning.   Changed and/or Refilled Medications    Modified Medication Previous Medication    DICYCLOMINE (BENTYL) 10 MG CAPSULE dicyclomine (BENTYL) 10 MG capsule       Take 1 capsule (10 mg total) by mouth 2 (two) times daily as needed (cramping).           Vital Signs:  /81   Pulse 78   Ht 5' 9" (1.753 m)   Wt 82.1 kg (181 lb 1.6 oz)   SpO2 98%   BMI 26.74 kg/m²   Body mass index is 26.74 kg/m².    Physical Exam  Vitals reviewed.   Constitutional:       General: She is awake. She is not in acute distress.     Appearance: Normal appearance. She is well-developed. She is not " ill-appearing, toxic-appearing or diaphoretic.   HENT:      Head: Normocephalic and atraumatic.      Nose: Nose normal.      Mouth/Throat:      Mouth: Mucous membranes are moist.      Pharynx: Oropharynx is clear. No oropharyngeal exudate or posterior oropharyngeal erythema.   Eyes:      General: Lids are normal. Gaze aligned appropriately. No scleral icterus.        Right eye: No discharge.         Left eye: No discharge.      Extraocular Movements: Extraocular movements intact.      Conjunctiva/sclera: Conjunctivae normal.   Neck:      Trachea: Trachea normal.   Cardiovascular:      Rate and Rhythm: Normal rate and regular rhythm.      Pulses:           Radial pulses are 2+ on the right side and 2+ on the left side.   Pulmonary:      Effort: Pulmonary effort is normal. No respiratory distress.      Breath sounds: Normal breath sounds. No stridor. No wheezing or rhonchi.   Chest:      Chest wall: No tenderness.   Abdominal:      General: Abdomen is flat. Bowel sounds are normal. There is no distension.      Palpations: Abdomen is soft. There is no fluid wave, hepatomegaly or mass.      Tenderness: There is no abdominal tenderness. There is no guarding or rebound.   Musculoskeletal:         General: No tenderness or deformity.      Cervical back: Full passive range of motion without pain and neck supple. No tenderness.      Right lower leg: No edema.      Left lower leg: No edema.   Lymphadenopathy:      Cervical: No cervical adenopathy.   Skin:     General: Skin is warm and dry.      Capillary Refill: Capillary refill takes less than 2 seconds.      Coloration: Skin is not cyanotic, jaundiced or pale.      Findings: No rash.   Neurological:      General: No focal deficit present.      Mental Status: She is alert and oriented to person, place, and time.      Cranial Nerves: No facial asymmetry.      Motor: No tremor.   Psychiatric:         Attention and Perception: Attention normal.         Mood and Affect: Mood and  affect normal.         Speech: Speech normal.         Behavior: Behavior normal. Behavior is cooperative.          All of the data above and below has been reviewed by myself and any further interpretations will be reflected in the assessment and plan.   The data includes review of external notes, and independent interpretation of lab results, procedures, x-rays, and imaging reports.      Assessment:  Irritable bowel syndrome with diarrhea  -     Ambulatory Referral to External Surgery    Bloating  -     Ambulatory Referral to External Surgery    Chronic diarrhea  -     Ambulatory Referral to External Surgery    Other orders  -     dicyclomine (BENTYL) 10 MG capsule; Take 1 capsule (10 mg total) by mouth 2 (two) times daily as needed (cramping).  Dispense: 100 capsule; Refill: 1      Recommend whole colon biopsies to rule out colitis. Recommend gastric and duodenal biopsies for H.pylori/Celiac.       Recommendations:  Schedule upper and lower endoscopy. Will give sample box of Sutab.  Continue fiber supplements daily, may increase dosing. May take dicyclomine as needed.     Risks, benefits, and alternatives of medical management, any associated procedures, and/or treatment discussed with the patient. Patient given opportunity to ask questions and voices understanding. Patient has elected to proceed with the recommended care modalities as discussed.    No follow-ups on file.    Order summary:  Orders Placed This Encounter   Procedures    Ambulatory Referral to External Surgery          Cristina Grayson MD    This document was created using a voice recognition transcribing system. Incorrect words or phrases may have been missed during proofreading. Please interpret accordingly or contact me for clarification.

## 2022-04-19 ENCOUNTER — PATIENT MESSAGE (OUTPATIENT)
Dept: GASTROENTEROLOGY | Facility: CLINIC | Age: 22
End: 2022-04-19
Payer: COMMERCIAL

## 2022-04-20 DIAGNOSIS — K58.0 IRRITABLE BOWEL SYNDROME WITH DIARRHEA: Primary | ICD-10-CM

## 2022-04-20 RX ORDER — DICYCLOMINE HYDROCHLORIDE 20 MG/1
20 TABLET ORAL 2 TIMES DAILY PRN
Qty: 60 TABLET | Refills: 1 | Status: SHIPPED | OUTPATIENT
Start: 2022-04-20 | End: 2022-05-19

## 2022-04-20 NOTE — TELEPHONE ENCOUNTER
I sent out dicyclomine 20 mg tablets for patient so she doesn't have to continue taking 2 pills since she has been taking 2 10 mg tablets daily.

## 2022-05-16 ENCOUNTER — PATIENT MESSAGE (OUTPATIENT)
Dept: GASTROENTEROLOGY | Facility: CLINIC | Age: 22
End: 2022-05-16
Payer: COMMERCIAL

## 2022-05-16 NOTE — TELEPHONE ENCOUNTER
Yes, going gluten-free may be tried. I feel low FODMAP diet would help her more. Difficult to do but effect and a good test of the source (if maldigestion).  She already failed 3-day Xifaxan samples and 2-week course is cost-prohibitive for the patient.  NBP

## 2022-07-06 ENCOUNTER — OUTSIDE PLACE OF SERVICE (OUTPATIENT)
Dept: GASTROENTEROLOGY | Facility: CLINIC | Age: 22
End: 2022-07-06
Payer: COMMERCIAL

## 2022-07-06 LAB — CRC RECOMMENDATION EXT: NORMAL

## 2022-07-06 PROCEDURE — 43239 PR EGD, FLEX, W/BIOPSY, SGL/MULTI: ICD-10-PCS | Mod: 51,,, | Performed by: INTERNAL MEDICINE

## 2022-07-06 PROCEDURE — 45380 PR COLONOSCOPY,BIOPSY: ICD-10-PCS | Mod: ,,, | Performed by: INTERNAL MEDICINE

## 2022-07-06 PROCEDURE — 45380 COLONOSCOPY AND BIOPSY: CPT | Mod: ,,, | Performed by: INTERNAL MEDICINE

## 2022-07-06 PROCEDURE — 43239 EGD BIOPSY SINGLE/MULTIPLE: CPT | Mod: 51,,, | Performed by: INTERNAL MEDICINE

## 2022-07-11 ENCOUNTER — TELEPHONE (OUTPATIENT)
Dept: GASTROENTEROLOGY | Facility: CLINIC | Age: 22
End: 2022-07-11
Payer: COMMERCIAL

## 2022-07-11 NOTE — TELEPHONE ENCOUNTER
DBx nl, GBx react w/focal ero w/o hp, CBx nl.  CMA to notify patient. No signs of infection, precancerous cells or Celiac disease on the upper endoscopy biopsies.  Repeat colonoscopy at 43 years of age for colon cancer screening purposes. Continue her fiber supplements. May continue Viberzi and PRN Bentyl. No signs of Celiac disease or H. Pylori infection. No colitis. Has she every looked into the low FODMAP diet to find if there are trigger foods/ingredients for her symptoms? This an an option for her as well.  NBP

## 2022-07-13 ENCOUNTER — PATIENT MESSAGE (OUTPATIENT)
Dept: GASTROENTEROLOGY | Facility: CLINIC | Age: 22
End: 2022-07-13
Payer: COMMERCIAL

## 2022-07-20 ENCOUNTER — PATIENT MESSAGE (OUTPATIENT)
Dept: GASTROENTEROLOGY | Facility: CLINIC | Age: 22
End: 2022-07-20
Payer: COMMERCIAL

## 2022-07-21 DIAGNOSIS — K52.9 CHRONIC DIARRHEA: Primary | ICD-10-CM

## 2022-07-21 DIAGNOSIS — R10.9 ABDOMINAL CRAMPING: ICD-10-CM

## 2022-07-21 DIAGNOSIS — R14.0 BLOATING: ICD-10-CM

## 2022-07-21 NOTE — PROGRESS NOTES
Ordered pancreatic elastase and fecal fat, qual to rule out EPI. Patient will also come by the office to  sucrase breath test kit.   MLC

## 2022-08-05 LAB
FECAL FAT, QUALITATIVE: NORMAL
PANCREATIC ELASTASE 1: >500 MCG/G

## 2022-08-08 ENCOUNTER — PATIENT MESSAGE (OUTPATIENT)
Dept: GASTROENTEROLOGY | Facility: CLINIC | Age: 22
End: 2022-08-08
Payer: COMMERCIAL

## 2022-08-08 NOTE — PROGRESS NOTES
Sucrose digestion (%) = 5.92. Normal sucrase activity. Pancreatic elastase and fecal fat both normal (reviewed in path lab system and printed for Epic).     CMA to notify patient. No signs of digestive enzyme deficiencies. Has she had a chance to try low FODMAP diet?

## 2022-09-12 ENCOUNTER — DOCUMENTATION ONLY (OUTPATIENT)
Dept: GASTROENTEROLOGY | Facility: CLINIC | Age: 22
End: 2022-09-12
Payer: COMMERCIAL

## 2022-10-12 ENCOUNTER — OFFICE VISIT (OUTPATIENT)
Dept: GASTROENTEROLOGY | Facility: CLINIC | Age: 22
End: 2022-10-12
Payer: COMMERCIAL

## 2022-10-12 VITALS
TEMPERATURE: 98 F | OXYGEN SATURATION: 98 % | HEART RATE: 82 BPM | DIASTOLIC BLOOD PRESSURE: 79 MMHG | WEIGHT: 184.81 LBS | BODY MASS INDEX: 26.46 KG/M2 | HEIGHT: 70 IN | SYSTOLIC BLOOD PRESSURE: 115 MMHG

## 2022-10-12 DIAGNOSIS — R10.9 ABDOMINAL CRAMPING: ICD-10-CM

## 2022-10-12 DIAGNOSIS — K52.9 CHRONIC DIARRHEA: ICD-10-CM

## 2022-10-12 DIAGNOSIS — R14.0 BLOATING: Primary | ICD-10-CM

## 2022-10-12 DIAGNOSIS — K58.0 IRRITABLE BOWEL SYNDROME WITH DIARRHEA: ICD-10-CM

## 2022-10-12 PROCEDURE — 99213 OFFICE O/P EST LOW 20 MIN: CPT | Mod: S$GLB,,, | Performed by: INTERNAL MEDICINE

## 2022-10-12 PROCEDURE — 3078F PR MOST RECENT DIASTOLIC BLOOD PRESSURE < 80 MM HG: ICD-10-PCS | Mod: CPTII,S$GLB,, | Performed by: INTERNAL MEDICINE

## 2022-10-12 PROCEDURE — 1160F RVW MEDS BY RX/DR IN RCRD: CPT | Mod: CPTII,S$GLB,, | Performed by: INTERNAL MEDICINE

## 2022-10-12 PROCEDURE — 3008F PR BODY MASS INDEX (BMI) DOCUMENTED: ICD-10-PCS | Mod: CPTII,S$GLB,, | Performed by: INTERNAL MEDICINE

## 2022-10-12 PROCEDURE — 1159F MED LIST DOCD IN RCRD: CPT | Mod: CPTII,S$GLB,, | Performed by: INTERNAL MEDICINE

## 2022-10-12 PROCEDURE — 3078F DIAST BP <80 MM HG: CPT | Mod: CPTII,S$GLB,, | Performed by: INTERNAL MEDICINE

## 2022-10-12 PROCEDURE — 99213 PR OFFICE/OUTPT VISIT, EST, LEVL III, 20-29 MIN: ICD-10-PCS | Mod: S$GLB,,, | Performed by: INTERNAL MEDICINE

## 2022-10-12 PROCEDURE — 1160F PR REVIEW ALL MEDS BY PRESCRIBER/CLIN PHARMACIST DOCUMENTED: ICD-10-PCS | Mod: CPTII,S$GLB,, | Performed by: INTERNAL MEDICINE

## 2022-10-12 PROCEDURE — 3074F PR MOST RECENT SYSTOLIC BLOOD PRESSURE < 130 MM HG: ICD-10-PCS | Mod: CPTII,S$GLB,, | Performed by: INTERNAL MEDICINE

## 2022-10-12 PROCEDURE — 3008F BODY MASS INDEX DOCD: CPT | Mod: CPTII,S$GLB,, | Performed by: INTERNAL MEDICINE

## 2022-10-12 PROCEDURE — 3074F SYST BP LT 130 MM HG: CPT | Mod: CPTII,S$GLB,, | Performed by: INTERNAL MEDICINE

## 2022-10-12 PROCEDURE — 1159F PR MEDICATION LIST DOCUMENTED IN MEDICAL RECORD: ICD-10-PCS | Mod: CPTII,S$GLB,, | Performed by: INTERNAL MEDICINE

## 2022-10-12 RX ORDER — ELUXADOLINE 75 MG/1
75 TABLET, FILM COATED ORAL DAILY
Qty: 90 TABLET | Refills: 4 | Status: SHIPPED | OUTPATIENT
Start: 2022-10-12 | End: 2023-05-31 | Stop reason: SDUPTHER

## 2022-10-12 NOTE — PROGRESS NOTES
Clinic Note    Reason for visit:  The primary encounter diagnosis was Bloating. Diagnoses of Chronic diarrhea, Abdominal cramping, and Irritable bowel syndrome with diarrhea were also pertinent to this visit.    PCP: Antonina Sinha       HPI:  This is a 21 y.o. female who is here for a follow up. Patient with h/o diarrhea, on Viberzi 75 mg and reports doing well with this. She went gluten free for 2 months and did not notice a difference in the bloating. She tried FODMAP diet as well. She states her abdominal cramping has stopped. She states her main issue is everyday after lunch she has a lot of bloating/gas that will lasts until she goes to sleep. Does not matter what she eats. She will feel better when she wakes up then after lunch the bloating starts. Denies heartburn. She is taking fiber, 1 gummy daily. Stopped fiber and still had bloating.     Sucrose digestion (%) = 5.92. Normal sucrase activity. Pancreatic elastase and fecal fat both normal     EGD/Colonoscopy 7/6/2022: DBx nl, GBx react w/focal ero w/o hp, CBx nl. Repeat colon at age 43. Continue her fiber supplements. May continue Viberzi and PRN Bentyl    Review of Systems   Constitutional:  Negative for chills, diaphoresis, fatigue, fever and unexpected weight change.   HENT:  Negative for mouth sores, nosebleeds, postnasal drip, sore throat, trouble swallowing and voice change.    Eyes:  Negative for pain, discharge and eye dryness.   Respiratory:  Negative for apnea, cough, choking, chest tightness, shortness of breath and wheezing.    Cardiovascular:  Negative for chest pain, palpitations, leg swelling and claudication.   Gastrointestinal:  Negative for abdominal distention, abdominal pain, anal bleeding, blood in stool, change in bowel habit, constipation, diarrhea, nausea, rectal pain, vomiting, reflux, fecal incontinence and change in bowel habit.   Genitourinary:  Negative for bladder incontinence, difficulty urinating, dysuria, flank pain,  frequency and hematuria.   Musculoskeletal:  Negative for arthralgias, back pain, joint swelling and joint deformity.   Integumentary:  Negative for color change, rash and wound.   Allergic/Immunologic: Negative for environmental allergies and food allergies.   Neurological:  Negative for seizures, facial asymmetry, speech difficulty, weakness, headaches and memory loss.   Hematological:  Negative for adenopathy. Does not bruise/bleed easily.   Psychiatric/Behavioral:  Negative for agitation, behavioral problems, confusion, hallucinations and sleep disturbance.       Past Medical History:   Diagnosis Date    Anxiety     BMI 26.0-26.9,adult     IBS (irritable bowel syndrome)      Past Surgical History:   Procedure Laterality Date    COLONOSCOPY  2016    UPPER GASTROINTESTINAL ENDOSCOPY       Family History   Problem Relation Age of Onset    Diabetes Paternal Grandfather     Crohn's disease Neg Hx     Liver disease Neg Hx     Liver cancer Neg Hx     Esophageal cancer Neg Hx     Pancreatic cancer Neg Hx     Ulcerative colitis Neg Hx     Throat cancer Neg Hx     Colon cancer Neg Hx     Stomach cancer Neg Hx      Social History     Tobacco Use    Smoking status: Never    Smokeless tobacco: Never   Substance Use Topics    Alcohol use: Yes     Alcohol/week: 2.0 standard drinks     Types: 2 Glasses of wine per week     Comment: socially     Drug use: Never     Review of patient's allergies indicates:  No Known Allergies     Medication List with Changes/Refills   Current Medications    DICYCLOMINE (BENTYL) 20 MG TABLET    TAKE 1 TABLET BY MOUTH TWICE A DAY AS NEEDED FOR ABDOMINAL CRAMPING    DROSPIRENONE-ETHINYL ESTRADIOL (TONY) 3-0.02 MG PER TABLET    Take 1 tablet by mouth once daily.    FLUOXETINE 10 MG CAPSULE        PROPRANOLOL (INDERAL) 10 MG TABLET        RETIN-A MICRO PUMP 0.06 % GLWP    Apply 1 Pump topically daily as needed.    SPIRONOLACTONE (ALDACTONE) 50 MG TABLET    Take 1 tablet by mouth once daily.   Changed  "and/or Refilled Medications    Modified Medication Previous Medication    VIBERZI 75 MG TAB VIBERZI 75 mg Tab       Take 75 mg by mouth once daily.    Take 75 mg by mouth once daily.         Vital Signs:  /79   Pulse 82   Temp 98.1 °F (36.7 °C)   Ht 5' 10" (1.778 m)   Wt 83.8 kg (184 lb 12.8 oz)   SpO2 98%   BMI 26.52 kg/m²         Physical Exam  Vitals reviewed.   Constitutional:       General: She is awake. She is not in acute distress.     Appearance: Normal appearance. She is well-developed. She is not ill-appearing, toxic-appearing or diaphoretic.   HENT:      Head: Normocephalic and atraumatic.      Nose: Nose normal.      Mouth/Throat:      Mouth: Mucous membranes are moist.      Pharynx: Oropharynx is clear. No oropharyngeal exudate or posterior oropharyngeal erythema.   Eyes:      General: Lids are normal. Gaze aligned appropriately. No scleral icterus.        Right eye: No discharge.         Left eye: No discharge.      Extraocular Movements: Extraocular movements intact.      Conjunctiva/sclera: Conjunctivae normal.   Neck:      Trachea: Trachea normal.   Cardiovascular:      Rate and Rhythm: Normal rate and regular rhythm.      Pulses:           Radial pulses are 2+ on the right side and 2+ on the left side.   Pulmonary:      Effort: Pulmonary effort is normal. No respiratory distress.      Breath sounds: Normal breath sounds. No stridor. No wheezing or rhonchi.   Chest:      Chest wall: No tenderness.   Abdominal:      General: Bowel sounds are normal. There is no distension.      Palpations: Abdomen is soft. There is no fluid wave, hepatomegaly or mass.      Tenderness: There is no abdominal tenderness. There is no guarding or rebound.   Musculoskeletal:         General: No tenderness or deformity.      Cervical back: Full passive range of motion without pain and neck supple. No tenderness.      Right lower leg: No edema.      Left lower leg: No edema.   Lymphadenopathy:      Cervical: No " cervical adenopathy.   Skin:     General: Skin is warm and dry.      Capillary Refill: Capillary refill takes less than 2 seconds.      Coloration: Skin is not cyanotic, jaundiced or pale.      Findings: No rash.   Neurological:      General: No focal deficit present.      Mental Status: She is alert and oriented to person, place, and time.      Cranial Nerves: No facial asymmetry.      Motor: No tremor.   Psychiatric:         Attention and Perception: Attention normal.         Mood and Affect: Mood and affect normal.         Speech: Speech normal.         Behavior: Behavior normal. Behavior is cooperative.          All of the data above and below has been reviewed by myself and any further interpretations will be reflected in the assessment and plan.   The data includes review of external notes, and independent interpretation of lab results, procedures, x-rays, and imaging reports.      Assessment:  Bloating    Chronic diarrhea  Comments:  Controlled with Viberzi 75 mg daily    Abdominal cramping  Comments:  Resolved    Irritable bowel syndrome with diarrhea  -     VIBERZI 75 mg Tab; Take 75 mg by mouth once daily.  Dispense: 90 tablet; Refill: 4    It is more the timing that she starts to pass gas from below rather than belching or bloating that is her issue.  She can try the simethicone but I am not fully convinced it will fully helped issue will not hurt.  At this point Viberzi 75 mg daily seems to be working well for her and I would encourage her to continue that in notify me sooner if any issues.  Dietary triggers will likely always be a potential issue.  Okay to try stopping fiber.     Recommendations:  Continue Viberzi 75mg daily.     Risks, benefits, and alternatives of medical management, any associated procedures, and/or treatment discussed with the patient. Patient given opportunity to ask questions and voices understanding. Patient has elected to proceed with the recommended care modalities as  discussed.    Follow up in about 1 year (around 10/12/2023).    Order summary:  No orders of the defined types were placed in this encounter.       Instructed patient to notify my office if they have not been contacted within two weeks after any procedures, submitting any samples (biopsies, blood, stool, urine, etc.) or after any imaging (X-ray, CT, MRI, etc.).     Cristina Grayson MD    This document may have been created using a voice recognition transcribing system. Incorrect words or phrases may have been missed during proofreading. Please interpret accordingly or contact me for clarification.

## 2022-10-12 NOTE — LETTER
October 12, 2022        Antonina Sinha MD  [address]  [address]  [place] [address]             [facility]  [address] [address]  Phone: [phone]  Fax: [phone]   Patient: Sophia Canseco   MR Number: [id number]   YOB: 2000   Date of Visit: 10/12/2022       Dear Dr. Sinha:    Thank you for referring Sophia Canseco to me for evaluation. Attached you will find relevant portions of my assessment and plan of care.    If you have questions, please do not hesitate to call me. I look forward to following Sophia Canseco along with you.    Sincerely,      Cristina Grayson MD            CC  No Recipients    Enclosure

## 2022-12-16 ENCOUNTER — OFFICE VISIT (OUTPATIENT)
Dept: OBSTETRICS AND GYNECOLOGY | Facility: CLINIC | Age: 22
End: 2022-12-16
Payer: COMMERCIAL

## 2022-12-16 VITALS
SYSTOLIC BLOOD PRESSURE: 123 MMHG | WEIGHT: 186 LBS | HEART RATE: 94 BPM | DIASTOLIC BLOOD PRESSURE: 85 MMHG | BODY MASS INDEX: 26.69 KG/M2

## 2022-12-16 DIAGNOSIS — G43.829 MENSTRUAL MIGRAINE WITHOUT STATUS MIGRAINOSUS, NOT INTRACTABLE: ICD-10-CM

## 2022-12-16 DIAGNOSIS — Z01.419 WOMEN'S ANNUAL ROUTINE GYNECOLOGICAL EXAMINATION: Primary | ICD-10-CM

## 2022-12-16 PROCEDURE — 1159F MED LIST DOCD IN RCRD: CPT | Mod: CPTII,S$GLB,, | Performed by: OBSTETRICS & GYNECOLOGY

## 2022-12-16 PROCEDURE — 1159F PR MEDICATION LIST DOCUMENTED IN MEDICAL RECORD: ICD-10-PCS | Mod: CPTII,S$GLB,, | Performed by: OBSTETRICS & GYNECOLOGY

## 2022-12-16 PROCEDURE — 3074F SYST BP LT 130 MM HG: CPT | Mod: CPTII,S$GLB,, | Performed by: OBSTETRICS & GYNECOLOGY

## 2022-12-16 PROCEDURE — 3074F PR MOST RECENT SYSTOLIC BLOOD PRESSURE < 130 MM HG: ICD-10-PCS | Mod: CPTII,S$GLB,, | Performed by: OBSTETRICS & GYNECOLOGY

## 2022-12-16 PROCEDURE — 3079F DIAST BP 80-89 MM HG: CPT | Mod: CPTII,S$GLB,, | Performed by: OBSTETRICS & GYNECOLOGY

## 2022-12-16 PROCEDURE — 3079F PR MOST RECENT DIASTOLIC BLOOD PRESSURE 80-89 MM HG: ICD-10-PCS | Mod: CPTII,S$GLB,, | Performed by: OBSTETRICS & GYNECOLOGY

## 2022-12-16 PROCEDURE — 3008F PR BODY MASS INDEX (BMI) DOCUMENTED: ICD-10-PCS | Mod: CPTII,S$GLB,, | Performed by: OBSTETRICS & GYNECOLOGY

## 2022-12-16 PROCEDURE — 99395 PREV VISIT EST AGE 18-39: CPT | Mod: S$GLB,,, | Performed by: OBSTETRICS & GYNECOLOGY

## 2022-12-16 PROCEDURE — 99395 PR PREVENTIVE VISIT,EST,18-39: ICD-10-PCS | Mod: S$GLB,,, | Performed by: OBSTETRICS & GYNECOLOGY

## 2022-12-16 PROCEDURE — 3008F BODY MASS INDEX DOCD: CPT | Mod: CPTII,S$GLB,, | Performed by: OBSTETRICS & GYNECOLOGY

## 2022-12-16 RX ORDER — TOPIRAMATE 25 MG/1
25 TABLET ORAL 2 TIMES DAILY
Qty: 20 TABLET | Refills: 2 | Status: SHIPPED | OUTPATIENT
Start: 2022-12-16 | End: 2023-01-18 | Stop reason: SDUPTHER

## 2022-12-16 NOTE — PROGRESS NOTES
Subjective:       Patient ID: Sophia Canseco is a 22 y.o. female.    Chief Complaint:  Well Woman      History of Present Illness  Pt here for gyn annual.  History and past labs reviewed with patient.    Complaints of headaches with cycles.       Review of Systems  Review of Systems   Constitutional:  Negative for chills and fever.   Respiratory:  Negative for shortness of breath.    Cardiovascular:  Negative for chest pain.   Gastrointestinal:  Negative for abdominal pain, blood in stool, constipation, diarrhea, nausea, vomiting and reflux.   Genitourinary:  Negative for dysmenorrhea, dyspareunia, dysuria, hematuria, hot flashes, menorrhagia, menstrual problem, pelvic pain, vaginal bleeding, vaginal discharge, postcoital bleeding and vaginal dryness.   Musculoskeletal:  Negative for arthralgias and joint swelling.   Integumentary:  Negative for rash, hair changes, breast mass, nipple discharge and breast skin changes.   Neurological:  Positive for headaches.   Psychiatric/Behavioral:  Negative for depression. The patient is not nervous/anxious.    Breast: Negative for asymmetry, lump, mass, nipple discharge and skin changes        Objective:     Vitals:    12/16/22 0818   BP: 123/85   Pulse: 94   Weight: 84.4 kg (186 lb)       Physical Exam:   Constitutional: She appears well-developed and well-nourished. No distress.    HENT:   Head: Normocephalic and atraumatic.    Eyes: Conjunctivae and EOM are normal.    Neck: No tracheal deviation present. No thyromegaly present.    Cardiovascular:       Exam reveals no clubbing, no cyanosis and no edema.        Pulmonary/Chest: Effort normal. No respiratory distress.        Abdominal: Soft. She exhibits no distension and no mass. There is no abdominal tenderness. There is no rebound and no guarding. No hernia.     Genitourinary:    Vagina, uterus and rectum normal.      Pelvic exam was performed with patient supine.   There is no rash, tenderness, lesion or injury on the  right labia. There is no rash, tenderness, lesion or injury on the left labia. Cervix is normal. Right adnexum displays no mass, no tenderness and no fullness. Left adnexum displays no mass, no tenderness and no fullness.                Skin: She is not diaphoretic. No cyanosis. Nails show no clubbing.        Assessment:        1. Women's annual routine gynecological examination                Plan:      Annual   Pap UTD   STD panel   Contraception - shauna   Start topamax on cycle   RTC 2 months     Patient was counseled today on current AS[facility]P pap guidelines, the recommendation for yearly pelvic exams, healthy diet and exercise routines, annual mammograms starting at age 40, and breast self awareness. She is to see her PCP for other health maintenance

## 2022-12-20 LAB
CHLAMYDIA: NEGATIVE
GONORRHEA: NEGATIVE
SOURCE: NORMAL

## 2022-12-21 LAB
SOURCE: NORMAL
TRICHOMONAS AMPLIFIED: NEGATIVE

## 2023-01-18 ENCOUNTER — PATIENT MESSAGE (OUTPATIENT)
Dept: OBSTETRICS AND GYNECOLOGY | Facility: CLINIC | Age: 23
End: 2023-01-18
Payer: COMMERCIAL

## 2023-01-18 DIAGNOSIS — G43.829 MENSTRUAL MIGRAINE WITHOUT STATUS MIGRAINOSUS, NOT INTRACTABLE: ICD-10-CM

## 2023-01-18 RX ORDER — TOPIRAMATE 25 MG/1
25 TABLET ORAL 2 TIMES DAILY
Qty: 20 TABLET | Refills: 2 | Status: SHIPPED | OUTPATIENT
Start: 2023-01-18 | End: 2023-10-12

## 2023-02-13 ENCOUNTER — OFFICE VISIT (OUTPATIENT)
Dept: OBSTETRICS AND GYNECOLOGY | Facility: CLINIC | Age: 23
End: 2023-02-13
Payer: COMMERCIAL

## 2023-02-13 VITALS
BODY MASS INDEX: 26.98 KG/M2 | SYSTOLIC BLOOD PRESSURE: 138 MMHG | WEIGHT: 188 LBS | DIASTOLIC BLOOD PRESSURE: 85 MMHG | HEART RATE: 98 BPM

## 2023-02-13 DIAGNOSIS — G43.829 MENSTRUAL MIGRAINE WITHOUT STATUS MIGRAINOSUS, NOT INTRACTABLE: Primary | ICD-10-CM

## 2023-02-13 PROCEDURE — 3079F PR MOST RECENT DIASTOLIC BLOOD PRESSURE 80-89 MM HG: ICD-10-PCS | Mod: CPTII,S$GLB,, | Performed by: OBSTETRICS & GYNECOLOGY

## 2023-02-13 PROCEDURE — 3008F PR BODY MASS INDEX (BMI) DOCUMENTED: ICD-10-PCS | Mod: CPTII,S$GLB,, | Performed by: OBSTETRICS & GYNECOLOGY

## 2023-02-13 PROCEDURE — 3079F DIAST BP 80-89 MM HG: CPT | Mod: CPTII,S$GLB,, | Performed by: OBSTETRICS & GYNECOLOGY

## 2023-02-13 PROCEDURE — 3008F BODY MASS INDEX DOCD: CPT | Mod: CPTII,S$GLB,, | Performed by: OBSTETRICS & GYNECOLOGY

## 2023-02-13 PROCEDURE — 99213 PR OFFICE/OUTPT VISIT, EST, LEVL III, 20-29 MIN: ICD-10-PCS | Mod: S$GLB,,, | Performed by: OBSTETRICS & GYNECOLOGY

## 2023-02-13 PROCEDURE — 3075F PR MOST RECENT SYSTOLIC BLOOD PRESS GE 130-139MM HG: ICD-10-PCS | Mod: CPTII,S$GLB,, | Performed by: OBSTETRICS & GYNECOLOGY

## 2023-02-13 PROCEDURE — 99213 OFFICE O/P EST LOW 20 MIN: CPT | Mod: S$GLB,,, | Performed by: OBSTETRICS & GYNECOLOGY

## 2023-02-13 PROCEDURE — 3075F SYST BP GE 130 - 139MM HG: CPT | Mod: CPTII,S$GLB,, | Performed by: OBSTETRICS & GYNECOLOGY

## 2023-02-13 RX ORDER — SUMATRIPTAN 50 MG/1
50 TABLET, FILM COATED ORAL ONCE AS NEEDED
Qty: 30 TABLET | Refills: 2 | Status: SHIPPED | OUTPATIENT
Start: 2023-02-13 | End: 2023-10-12

## 2023-02-13 NOTE — PROGRESS NOTES
Subjective:       Patient ID: Sophia Canseco is a 22 y.o. female.    Chief Complaint:  Follow-up      History of Present Illness  Pt here for HA.  History and past labs reviewed with patient.    Complaints of headaches with cycles. Didn't like topamax, felt dizzy. Still having HA occasionally.       Review of Systems  Review of Systems   Constitutional:  Negative for chills and fever.   Respiratory:  Negative for shortness of breath.    Cardiovascular:  Negative for chest pain.   Gastrointestinal:  Negative for abdominal pain, blood in stool, constipation, diarrhea, nausea, vomiting and reflux.   Genitourinary:  Negative for dysmenorrhea, dyspareunia, dysuria, hematuria, hot flashes, menorrhagia, menstrual problem, pelvic pain, vaginal bleeding, vaginal discharge, postcoital bleeding and vaginal dryness.   Musculoskeletal:  Negative for arthralgias and joint swelling.   Integumentary:  Negative for rash, hair changes, breast mass, nipple discharge and breast skin changes.   Neurological:  Positive for headaches.   Psychiatric/Behavioral:  Negative for depression. The patient is not nervous/anxious.    Breast: Negative for asymmetry, lump, mass, nipple discharge and skin changes        Objective:     Vitals:    02/13/23 1304   BP: 138/85   Pulse: 98   Weight: 85.3 kg (188 lb)       Physical Exam:   Constitutional: She appears well-developed and well-nourished. No distress.    HENT:   Head: Normocephalic and atraumatic.    Eyes: Conjunctivae and EOM are normal.    Neck: No tracheal deviation present. No thyromegaly present.    Cardiovascular:       Exam reveals no clubbing, no cyanosis and no edema.        Pulmonary/Chest: Effort normal. No respiratory distress.        Abdominal: Soft. She exhibits no distension and no mass. There is no abdominal tenderness. There is no rebound and no guarding. No hernia.     Genitourinary:    Vagina, uterus and rectum normal.      Pelvic exam was performed with patient supine.    There is no rash, tenderness, lesion or injury on the right labia. There is no rash, tenderness, lesion or injury on the left labia. Cervix is normal. Right adnexum displays no mass, no tenderness and no fullness. Left adnexum displays no mass, no tenderness and no fullness.                Skin: She is not diaphoretic. No cyanosis. Nails show no clubbing.        Assessment:        1. Menstrual migraine without status migrainosus, not intractable                  Plan:      Continue shauna  Immitrex PRN HA   Topamax self d/c'd   RTC PRN

## 2023-05-30 ENCOUNTER — PATIENT MESSAGE (OUTPATIENT)
Dept: GASTROENTEROLOGY | Facility: CLINIC | Age: 23
End: 2023-05-30
Payer: COMMERCIAL

## 2023-05-30 DIAGNOSIS — K58.0 IRRITABLE BOWEL SYNDROME WITH DIARRHEA: ICD-10-CM

## 2023-05-30 RX ORDER — ELUXADOLINE 75 MG/1
75 TABLET, FILM COATED ORAL DAILY
Qty: 90 TABLET | Refills: 4 | OUTPATIENT
Start: 2023-05-30

## 2023-05-30 RX ORDER — ELUXADOLINE 75 MG/1
75 TABLET, FILM COATED ORAL DAILY
Qty: 90 TABLET | Refills: 4 | Status: CANCELLED | OUTPATIENT
Start: 2023-05-30

## 2023-05-30 NOTE — TELEPHONE ENCOUNTER
A 1 year supply was sent by Dr. Grayson in 10/2022 to [facility]. She should still have medication left. Confirm [facility] is pharmacy.   MLC

## 2023-05-31 ENCOUNTER — PATIENT MESSAGE (OUTPATIENT)
Dept: GASTROENTEROLOGY | Facility: CLINIC | Age: 23
End: 2023-05-31
Payer: COMMERCIAL

## 2023-05-31 DIAGNOSIS — K58.0 IRRITABLE BOWEL SYNDROME WITH DIARRHEA: ICD-10-CM

## 2023-05-31 RX ORDER — ELUXADOLINE 75 MG/1
75 TABLET, FILM COATED ORAL DAILY
Qty: 90 TABLET | Refills: 1 | Status: SHIPPED | OUTPATIENT
Start: 2023-05-31 | End: 2023-10-12 | Stop reason: SDUPTHER

## 2023-07-10 ENCOUNTER — PATIENT MESSAGE (OUTPATIENT)
Dept: OBSTETRICS AND GYNECOLOGY | Facility: CLINIC | Age: 23
End: 2023-07-10
Payer: COMMERCIAL

## 2023-07-27 ENCOUNTER — OFFICE VISIT (OUTPATIENT)
Dept: OBSTETRICS AND GYNECOLOGY | Facility: CLINIC | Age: 23
End: 2023-07-27
Payer: COMMERCIAL

## 2023-07-27 VITALS
BODY MASS INDEX: 26.54 KG/M2 | DIASTOLIC BLOOD PRESSURE: 88 MMHG | SYSTOLIC BLOOD PRESSURE: 132 MMHG | HEART RATE: 94 BPM | WEIGHT: 185 LBS

## 2023-07-27 DIAGNOSIS — N91.2 AMENORRHEA: Primary | ICD-10-CM

## 2023-07-27 LAB
ABS NRBC COUNT: 0 X 10 3/UL (ref 0–0.01)
ABSOLUTE BASOPHIL: 0.02 X 10 3/UL (ref 0–0.22)
ABSOLUTE EOSINOPHIL: 0.03 X 10 3/UL (ref 0.04–0.54)
ABSOLUTE IMMATURE GRAN: 0.03 X 10 3/UL (ref 0–0.04)
ABSOLUTE LYMPHOCYTE: 1.93 X 10 3/UL (ref 0.86–4.75)
ABSOLUTE MONOCYTE: 0.54 X 10 3/UL (ref 0.22–1.08)
ANION GAP SERPL CALC-SCNC: 13 MMOL/L (ref 8–17)
BASOPHILS NFR BLD: 0.3 % (ref 0.2–1.2)
BUN/CREAT SERPL: 15.3 (ref 6–20)
CALCIUM SERPL-MCNC: 10 MG/DL (ref 8.6–10.2)
CARBON DIOXIDE, CO2: 23 MMOL/L (ref 22–29)
CHLORIDE: 105 MMOL/L (ref 98–107)
CREAT SERPL-MCNC: 0.7 MG/DL (ref 0.5–0.9)
EOSINOPHIL NFR BLD: 0.4 % (ref 0.7–7)
GFR ESTIMATION: 123.78 ML/MIN/1.73M2
GLUCOSE: 96 MG/DL (ref 74–106)
HCT VFR BLD AUTO: 43.4 % (ref 37–47)
HGB BLD-MCNC: 14.1 G/DL (ref 12–16)
IMMATURE GRANULOCYTES: 0.4 % (ref 0–0.5)
LYMPHOCYTES NFR BLD: 27.8 % (ref 19.3–53.1)
MCH RBC QN AUTO: 28.4 PG (ref 27–32)
MCHC RBC AUTO-ENTMCNC: 32.5 G/DL (ref 32–36)
MCV RBC AUTO: 87.5 FL (ref 82–100)
MONOCYTES NFR BLD: 7.8 % (ref 4.7–12.5)
NEUTROPHILS # BLD AUTO: 4.38 X 10 3/UL (ref 2.15–7.56)
NEUTROPHILS NFR BLD: 63.3 % (ref 34–71.1)
NUCLEATED RED BLOOD CELLS: 0 /100 WBC (ref 0–0.2)
PLATELET # BLD AUTO: 333 X 10 3/UL (ref 135–400)
POTASSIUM: 4.9 MMOL/L (ref 3.5–5.1)
PROLACTIN SERPL-MCNC: 9.38 NG/ML (ref 4.79–23.3)
RBC # BLD AUTO: 4.96 X 10 6/UL (ref 4.2–5.4)
RDW-SD: 44.2 FL (ref 37–54)
SODIUM: 141 MMOL/L (ref 136–145)
TSH W/REFLEX TO FT4: 2.14 UIU/ML (ref 0.27–4.2)
UREA NITROGEN (BUN): 10.7 MG/DL (ref 6–20)
WBC # BLD: 6.93 X 10 3/UL (ref 4.3–10.8)

## 2023-07-27 PROCEDURE — 3075F SYST BP GE 130 - 139MM HG: CPT | Mod: CPTII,S$GLB,, | Performed by: OBSTETRICS & GYNECOLOGY

## 2023-07-27 PROCEDURE — 99214 OFFICE O/P EST MOD 30 MIN: CPT | Mod: S$GLB,,, | Performed by: OBSTETRICS & GYNECOLOGY

## 2023-07-27 PROCEDURE — 3008F BODY MASS INDEX DOCD: CPT | Mod: CPTII,S$GLB,, | Performed by: OBSTETRICS & GYNECOLOGY

## 2023-07-27 PROCEDURE — 3075F PR MOST RECENT SYSTOLIC BLOOD PRESS GE 130-139MM HG: ICD-10-PCS | Mod: CPTII,S$GLB,, | Performed by: OBSTETRICS & GYNECOLOGY

## 2023-07-27 PROCEDURE — 3079F DIAST BP 80-89 MM HG: CPT | Mod: CPTII,S$GLB,, | Performed by: OBSTETRICS & GYNECOLOGY

## 2023-07-27 PROCEDURE — 3008F PR BODY MASS INDEX (BMI) DOCUMENTED: ICD-10-PCS | Mod: CPTII,S$GLB,, | Performed by: OBSTETRICS & GYNECOLOGY

## 2023-07-27 PROCEDURE — 1159F MED LIST DOCD IN RCRD: CPT | Mod: CPTII,S$GLB,, | Performed by: OBSTETRICS & GYNECOLOGY

## 2023-07-27 PROCEDURE — 3079F PR MOST RECENT DIASTOLIC BLOOD PRESSURE 80-89 MM HG: ICD-10-PCS | Mod: CPTII,S$GLB,, | Performed by: OBSTETRICS & GYNECOLOGY

## 2023-07-27 PROCEDURE — 1159F PR MEDICATION LIST DOCUMENTED IN MEDICAL RECORD: ICD-10-PCS | Mod: CPTII,S$GLB,, | Performed by: OBSTETRICS & GYNECOLOGY

## 2023-07-27 PROCEDURE — 99214 PR OFFICE/OUTPT VISIT, EST, LEVL IV, 30-39 MIN: ICD-10-PCS | Mod: S$GLB,,, | Performed by: OBSTETRICS & GYNECOLOGY

## 2023-07-28 NOTE — PROGRESS NOTES
Subjective:       Patient ID: Sophia Canseco is a 22 y.o. female.    Chief Complaint:  irregular cycles      History of Present Illness  Pt here for irregular cycles.  History and past labs reviewed with patient.    Complaints of irregular bleeding and skipping cycles.       Review of Systems  Review of Systems   Constitutional:  Negative for chills and fever.   Respiratory:  Negative for shortness of breath.    Cardiovascular:  Negative for chest pain.   Gastrointestinal:  Negative for abdominal pain, blood in stool, constipation, diarrhea, nausea, vomiting and reflux.   Genitourinary:  Positive for menorrhagia and menstrual problem. Negative for dysmenorrhea, dyspareunia, dysuria, hematuria, hot flashes, pelvic pain, vaginal bleeding, vaginal discharge, postcoital bleeding and vaginal dryness.   Musculoskeletal:  Negative for arthralgias and joint swelling.   Integumentary:  Negative for rash, hair changes, breast mass, nipple discharge and breast skin changes.   Psychiatric/Behavioral:  Negative for depression. The patient is not nervous/anxious.    Breast: Negative for asymmetry, lump, mass, nipple discharge and skin changes        Objective:     Vitals:    07/27/23 0944   BP: 132/88   Pulse: 94   Weight: 83.9 kg (185 lb)       Physical Exam:   Constitutional: She appears well-developed and well-nourished. No distress.    HENT:   Head: Normocephalic.    Eyes: Conjunctivae and EOM are normal.    Neck: No tracheal deviation present. No thyromegaly present.    Cardiovascular:       Exam reveals no clubbing, no cyanosis and no edema.        Pulmonary/Chest: Effort normal. No respiratory distress.                  Musculoskeletal: Normal range of motion and moves all extremeties.        Skin: No rash noted. She is not diaphoretic. No cyanosis. Nails show no clubbing.    Psychiatric: She has a normal mood and affect. Her behavior is normal. Judgment and thought content normal.          Assessment:        1.  Amenorrhea                Plan:      TSH, prolactin, CBC, BMP  Pelvic US ordered   RTC for results

## 2023-08-02 DIAGNOSIS — N91.2 AMENORRHEA: Primary | ICD-10-CM

## 2023-08-08 ENCOUNTER — PROCEDURE VISIT (OUTPATIENT)
Dept: OBSTETRICS AND GYNECOLOGY | Facility: CLINIC | Age: 23
End: 2023-08-08
Payer: COMMERCIAL

## 2023-08-08 DIAGNOSIS — N91.2 AMENORRHEA: ICD-10-CM

## 2023-08-08 PROCEDURE — 76830 TRANSVAGINAL US NON-OB: CPT | Mod: S$GLB,,, | Performed by: OBSTETRICS & GYNECOLOGY

## 2023-08-08 PROCEDURE — 76830 US OB/GYN PROCEDURE (VIEWPOINT): ICD-10-PCS | Mod: S$GLB,,, | Performed by: OBSTETRICS & GYNECOLOGY

## 2023-08-09 ENCOUNTER — PATIENT MESSAGE (OUTPATIENT)
Dept: OBSTETRICS AND GYNECOLOGY | Facility: CLINIC | Age: 23
End: 2023-08-09
Payer: COMMERCIAL

## 2023-08-09 DIAGNOSIS — N94.6 DYSMENORRHEA: Primary | ICD-10-CM

## 2023-08-10 RX ORDER — LEVONORGESTREL AND ETHINYL ESTRADIOL 0.1-0.02MG
1 KIT ORAL DAILY
Qty: 30 TABLET | Refills: 2 | Status: SHIPPED | OUTPATIENT
Start: 2023-08-10 | End: 2023-10-12

## 2023-09-07 ENCOUNTER — PATIENT MESSAGE (OUTPATIENT)
Dept: GASTROENTEROLOGY | Facility: CLINIC | Age: 23
End: 2023-09-07
Payer: COMMERCIAL

## 2023-10-12 ENCOUNTER — OFFICE VISIT (OUTPATIENT)
Dept: GASTROENTEROLOGY | Facility: CLINIC | Age: 23
End: 2023-10-12
Payer: COMMERCIAL

## 2023-10-12 VITALS — WEIGHT: 183.63 LBS | BODY MASS INDEX: 26.29 KG/M2 | HEIGHT: 70 IN

## 2023-10-12 DIAGNOSIS — R14.0 BLOATING: ICD-10-CM

## 2023-10-12 DIAGNOSIS — K58.0 IRRITABLE BOWEL SYNDROME WITH DIARRHEA: Primary | ICD-10-CM

## 2023-10-12 PROCEDURE — 1159F PR MEDICATION LIST DOCUMENTED IN MEDICAL RECORD: ICD-10-PCS | Mod: CPTII,S$GLB,, | Performed by: INTERNAL MEDICINE

## 2023-10-12 PROCEDURE — 3008F PR BODY MASS INDEX (BMI) DOCUMENTED: ICD-10-PCS | Mod: CPTII,S$GLB,, | Performed by: INTERNAL MEDICINE

## 2023-10-12 PROCEDURE — 99213 PR OFFICE/OUTPT VISIT, EST, LEVL III, 20-29 MIN: ICD-10-PCS | Mod: S$GLB,,, | Performed by: INTERNAL MEDICINE

## 2023-10-12 PROCEDURE — 1160F PR REVIEW ALL MEDS BY PRESCRIBER/CLIN PHARMACIST DOCUMENTED: ICD-10-PCS | Mod: CPTII,S$GLB,, | Performed by: INTERNAL MEDICINE

## 2023-10-12 PROCEDURE — 99213 OFFICE O/P EST LOW 20 MIN: CPT | Mod: S$GLB,,, | Performed by: INTERNAL MEDICINE

## 2023-10-12 PROCEDURE — 1160F RVW MEDS BY RX/DR IN RCRD: CPT | Mod: CPTII,S$GLB,, | Performed by: INTERNAL MEDICINE

## 2023-10-12 PROCEDURE — 1159F MED LIST DOCD IN RCRD: CPT | Mod: CPTII,S$GLB,, | Performed by: INTERNAL MEDICINE

## 2023-10-12 PROCEDURE — 3008F BODY MASS INDEX DOCD: CPT | Mod: CPTII,S$GLB,, | Performed by: INTERNAL MEDICINE

## 2023-10-12 RX ORDER — VALACYCLOVIR HYDROCHLORIDE 1 G/1
1000 TABLET, FILM COATED ORAL 3 TIMES DAILY
COMMUNITY
Start: 2023-05-30

## 2023-10-12 RX ORDER — DROSPIRENONE AND ETHINYL ESTRADIOL TABLETS 0.02-3(28)
1 KIT ORAL
COMMUNITY
Start: 2023-09-21

## 2023-10-12 RX ORDER — ELUXADOLINE 75 MG/1
75 TABLET, FILM COATED ORAL DAILY
Qty: 90 TABLET | Refills: 3 | Status: SHIPPED | OUTPATIENT
Start: 2023-10-12

## 2023-10-12 RX ORDER — TAZAROTENE 0.45 MG/G
LOTION TOPICAL
COMMUNITY
Start: 2023-06-08

## 2023-10-12 NOTE — PATIENT INSTRUCTIONS
Continue with Viberzi 75 mg daily.    Please notify my office if you have not been contacted within two weeks after any procedures, submitting any samples (biopsies, blood, stool, urine, etc.) or after any imaging (X-ray, CT, MRI, etc.).

## 2023-10-12 NOTE — PROGRESS NOTES
Clinic Note    Reason for visit:  The primary encounter diagnosis was Irritable bowel syndrome with diarrhea. A diagnosis of Bloating was also pertinent to this visit.    PCP: Antonina Sinha       HPI:  This is a 22 y.o. female who is here for a follow up. Patient with IBS-D. On Viberzi and dicyclomine as needed. More stress with work/school causes increase in abdominal pain and bloating. Also was taken off of fluoxetine during this time. She has started back on Fluoxetine and taking dicyclomine BID as needed and symptoms are much better. Still taking gasX as needed for bloating which helps. She is on Viberzi 75mg once daily. She does notice if she misses a dose of Viberzi she will have abdominal pain. She is having BM daily but may go 1-2 days without BM.     Sucrose digestion (%) = 5.92. Normal sucrase activity. Pancreatic elastase and fecal fat both normal      EGD/Colonoscopy 7/6/2022: DBx nl, GBx react w/focal ero w/o hp, CBx nl. Repeat colon at age 43. Continue her fiber supplements. May continue Viberzi and PRN Bentyl    Review of Systems   Constitutional:  Negative for fatigue, fever and unexpected weight change.   HENT:  Negative for mouth sores, postnasal drip, sore throat and trouble swallowing.    Eyes:  Negative for pain, discharge and eye dryness.   Respiratory:  Negative for apnea, cough, choking, chest tightness, shortness of breath and wheezing.    Cardiovascular:  Negative for chest pain, palpitations and leg swelling.   Gastrointestinal:  Negative for abdominal distention, abdominal pain, anal bleeding, blood in stool, change in bowel habit, constipation, diarrhea, nausea, rectal pain, vomiting, reflux and fecal incontinence.   Genitourinary:  Negative for bladder incontinence, dysuria and hematuria.   Musculoskeletal:  Negative for arthralgias, back pain and joint swelling.   Integumentary:  Negative for color change and rash.   Allergic/Immunologic: Negative for environmental allergies and  food allergies.   Neurological:  Negative for seizures and headaches.   Hematological:  Negative for adenopathy. Does not bruise/bleed easily.        Past Medical History:   Diagnosis Date    Acne     Anxiety     BMI 26.0-26.9,adult     IBS (irritable bowel syndrome)      Past Surgical History:   Procedure Laterality Date    COLONOSCOPY  2016    UPPER GASTROINTESTINAL ENDOSCOPY       Family History   Problem Relation Age of Onset    Diabetes Paternal Grandfather     Crohn's disease Neg Hx     Liver disease Neg Hx     Liver cancer Neg Hx     Esophageal cancer Neg Hx     Pancreatic cancer Neg Hx     Ulcerative colitis Neg Hx     Throat cancer Neg Hx     Colon cancer Neg Hx     Stomach cancer Neg Hx      Social History     Tobacco Use    Smoking status: Never    Smokeless tobacco: Never   Substance Use Topics    Alcohol use: Yes     Alcohol/week: 2.0 standard drinks of alcohol     Types: 2 Glasses of wine per week     Comment: socially     Drug use: Never     Review of patient's allergies indicates:  No Known Allergies     Medication List with Changes/Refills   Current Medications    ARAZLO 0.045 % LOTN        DICYCLOMINE (BENTYL) 20 MG TABLET    Take 1 tablet (20 mg total) by mouth 2 (two) times daily as needed (abdominal cramping or diarrhea).    FLUOXETINE 10 MG CAPSULE        LORYNA, 28, 3-0.02 MG PER TABLET    Take 1 tablet by mouth.    PROPRANOLOL (INDERAL) 10 MG TABLET        SPIRONOLACTONE (ALDACTONE) 50 MG TABLET    Take 1 tablet by mouth once daily.    VALACYCLOVIR (VALTREX) 1000 MG TABLET    Take 1,000 mg by mouth 3 (three) times daily.   Changed and/or Refilled Medications    Modified Medication Previous Medication    VIBERZI 75 MG TAB VIBERZI 75 mg Tab       Take 75 mg by mouth once daily.    Take 75 mg by mouth once daily.   Discontinued Medications    LEVONORGEST-ETH.ESTRADIOL-IRON (BALCOLTRA) 0.1 MG-0.02 MG (21)/36.5 MG(7) TAB    Take 1 tablet by mouth Daily.    RETIN-A MICRO PUMP 0.06 % GLWP    Apply 1  "Pump topically daily as needed.    SUMATRIPTAN (IMITREX) 50 MG TABLET    Take 1 tablet (50 mg total) by mouth once as needed for Migraine.    TOPIRAMATE (TOPAMAX) 25 MG TABLET    Take 1 tablet (25 mg total) by mouth 2 (two) times daily. During cycle         Vital Signs:  Ht 5' 10" (1.778 m)   Wt 83.3 kg (183 lb 9.6 oz)   BMI 26.34 kg/m²         Physical Exam  Vitals reviewed.   Constitutional:       General: She is awake. She is not in acute distress.     Appearance: Normal appearance. She is well-developed. She is not ill-appearing, toxic-appearing or diaphoretic.   HENT:      Head: Normocephalic and atraumatic.      Nose: Nose normal.      Mouth/Throat:      Mouth: Mucous membranes are moist.      Pharynx: Oropharynx is clear. No oropharyngeal exudate or posterior oropharyngeal erythema.   Eyes:      General: Lids are normal. Gaze aligned appropriately. No scleral icterus.        Right eye: No discharge.         Left eye: No discharge.      Conjunctiva/sclera: Conjunctivae normal.   Neck:      Trachea: Trachea normal.   Cardiovascular:      Rate and Rhythm: Normal rate and regular rhythm.      Pulses:           Radial pulses are 2+ on the right side and 2+ on the left side.   Pulmonary:      Effort: Pulmonary effort is normal. No respiratory distress.      Breath sounds: No stridor. No wheezing.   Chest:      Chest wall: No tenderness.   Abdominal:      General: Bowel sounds are normal. There is no distension.      Palpations: Abdomen is soft. There is no fluid wave, hepatomegaly or mass.      Tenderness: There is no abdominal tenderness. There is no guarding or rebound.   Musculoskeletal:         General: No tenderness or deformity.      Cervical back: Full passive range of motion without pain and neck supple. No tenderness.      Right lower leg: No edema.      Left lower leg: No edema.   Lymphadenopathy:      Cervical: No cervical adenopathy.   Skin:     General: Skin is warm and dry.      Capillary Refill: " Capillary refill takes less than 2 seconds.      Coloration: Skin is not cyanotic, jaundiced or pale.   Neurological:      General: No focal deficit present.      Mental Status: She is alert and oriented to person, place, and time.      Motor: No tremor.   Psychiatric:         Attention and Perception: Attention normal.         Mood and Affect: Mood and affect normal.         Speech: Speech normal.         Behavior: Behavior normal. Behavior is cooperative.            All of the data above and below has been reviewed by myself and any further interpretations will be reflected in the assessment and plan.   The data includes review of external notes, and independent interpretation of lab results, procedures, x-rays, and imaging reports.      Assessment:  Irritable bowel syndrome with diarrhea  -     VIBERZI 75 mg Tab; Take 75 mg by mouth once daily.  Dispense: 90 tablet; Refill: 3    Bloating      IBS-D- On viberzi 75mg daily and bentyl as needed. Stress exacerbates symtpoms. Now back on fluoxetine and doing well.     Recommendations:    Continue with Viberzi 75mg daily.      Risks, benefits, and alternatives of medical management, any associated procedures, and/or treatment discussed with the patient. Patient given opportunity to ask questions and voices understanding. Patient has elected to proceed with the recommended care modalities as discussed.    Instructed patient to notify my office if they have not been contacted within two weeks after any procedures, submitting any samples (biopsies, blood, stool, urine, etc.) or after any imaging (X-ray, CT, MRI, etc.).     Follow up in about 1 year (around 10/12/2024).    Order summary:  No orders of the defined types were placed in this encounter.     This assessment, plan, and documentation was performed in collaboration with Sindhu Ludwig NP.     This document may have been created using a voice recognition transcribing system. Incorrect words or phrases may have  been missed during proofreading. Please interpret accordingly or contact me for clarification.     Cristina Grayson MD

## 2023-12-19 ENCOUNTER — OFFICE VISIT (OUTPATIENT)
Dept: OBSTETRICS AND GYNECOLOGY | Facility: CLINIC | Age: 23
End: 2023-12-19
Payer: COMMERCIAL

## 2023-12-19 VITALS
BODY MASS INDEX: 26.98 KG/M2 | WEIGHT: 188 LBS | SYSTOLIC BLOOD PRESSURE: 123 MMHG | DIASTOLIC BLOOD PRESSURE: 80 MMHG | HEART RATE: 102 BPM

## 2023-12-19 DIAGNOSIS — Z01.419 WOMEN'S ANNUAL ROUTINE GYNECOLOGICAL EXAMINATION: Primary | ICD-10-CM

## 2023-12-19 PROCEDURE — 99395 PR PREVENTIVE VISIT,EST,18-39: ICD-10-PCS | Mod: S$GLB,,, | Performed by: OBSTETRICS & GYNECOLOGY

## 2023-12-19 PROCEDURE — 3008F BODY MASS INDEX DOCD: CPT | Mod: CPTII,S$GLB,, | Performed by: OBSTETRICS & GYNECOLOGY

## 2023-12-19 PROCEDURE — 3074F SYST BP LT 130 MM HG: CPT | Mod: CPTII,S$GLB,, | Performed by: OBSTETRICS & GYNECOLOGY

## 2023-12-19 PROCEDURE — 3079F DIAST BP 80-89 MM HG: CPT | Mod: CPTII,S$GLB,, | Performed by: OBSTETRICS & GYNECOLOGY

## 2023-12-19 PROCEDURE — 1159F MED LIST DOCD IN RCRD: CPT | Mod: CPTII,S$GLB,, | Performed by: OBSTETRICS & GYNECOLOGY

## 2023-12-19 PROCEDURE — 1159F PR MEDICATION LIST DOCUMENTED IN MEDICAL RECORD: ICD-10-PCS | Mod: CPTII,S$GLB,, | Performed by: OBSTETRICS & GYNECOLOGY

## 2023-12-19 PROCEDURE — 3008F PR BODY MASS INDEX (BMI) DOCUMENTED: ICD-10-PCS | Mod: CPTII,S$GLB,, | Performed by: OBSTETRICS & GYNECOLOGY

## 2023-12-19 PROCEDURE — 3079F PR MOST RECENT DIASTOLIC BLOOD PRESSURE 80-89 MM HG: ICD-10-PCS | Mod: CPTII,S$GLB,, | Performed by: OBSTETRICS & GYNECOLOGY

## 2023-12-19 PROCEDURE — 99395 PREV VISIT EST AGE 18-39: CPT | Mod: S$GLB,,, | Performed by: OBSTETRICS & GYNECOLOGY

## 2023-12-19 PROCEDURE — 3074F PR MOST RECENT SYSTOLIC BLOOD PRESSURE < 130 MM HG: ICD-10-PCS | Mod: CPTII,S$GLB,, | Performed by: OBSTETRICS & GYNECOLOGY

## 2023-12-19 NOTE — PROGRESS NOTES
Subjective:       Patient ID: Sophia Canseco is a 23 y.o. female.    Chief Complaint:  Well Woman      History of Present Illness  Pt here for gyn annual.  History and past labs reviewed with patient.    Complaints none       Review of Systems  Review of Systems   Constitutional:  Negative for chills and fever.   Respiratory:  Negative for shortness of breath.    Cardiovascular:  Negative for chest pain.   Gastrointestinal:  Negative for abdominal pain, blood in stool, constipation, diarrhea, nausea, vomiting and reflux.   Genitourinary:  Negative for dysmenorrhea, dyspareunia, dysuria, hematuria, hot flashes, menorrhagia, menstrual problem, pelvic pain, vaginal bleeding, vaginal discharge, postcoital bleeding and vaginal dryness.   Musculoskeletal:  Negative for arthralgias and joint swelling.   Integumentary:  Negative for rash, hair changes, breast mass, nipple discharge and breast skin changes.   Neurological:  Negative for headaches.   Psychiatric/Behavioral:  Negative for depression. The patient is not nervous/anxious.    Breast: Negative for asymmetry, lump, mass, nipple discharge and skin changes          Objective:     Vitals:    12/19/23 0851   BP: 123/80   Pulse: 102   Weight: 85.3 kg (188 lb)       Physical Exam:   Constitutional: She appears well-developed and well-nourished. No distress.    HENT:   Head: Normocephalic and atraumatic.    Eyes: Conjunctivae and EOM are normal.    Neck: No tracheal deviation present. No thyromegaly present.    Cardiovascular:       Exam reveals no clubbing, no cyanosis and no edema.        Pulmonary/Chest: Effort normal. No respiratory distress.        Abdominal: Soft. She exhibits no distension and no mass. There is no abdominal tenderness. There is no rebound and no guarding. No hernia.     Genitourinary:    Vagina, uterus and rectum normal.      Pelvic exam was performed with patient supine.   There is no rash, tenderness, lesion or injury on the right labia. There  is no rash, tenderness, lesion or injury on the left labia. Cervix is normal. Right adnexum displays no mass, no tenderness and no fullness. Left adnexum displays no mass, no tenderness and no fullness.                Skin: She is not diaphoretic. No cyanosis. Nails show no clubbing.          Assessment:        1. Women's annual routine gynecological examination                  Plan:      Annual   Pap UTD   STD panel declined   Contraception - OCPs   RTC annual exam     Patient was counseled today on current [facility]P pap guidelines, the recommendation for yearly pelvic exams, healthy diet and exercise routines, annual mammograms starting at age 40, and breast self awareness. She is to see her PCP for other health maintenance

## 2023-12-21 ENCOUNTER — PATIENT MESSAGE (OUTPATIENT)
Dept: OBSTETRICS AND GYNECOLOGY | Facility: CLINIC | Age: 23
End: 2023-12-21
Payer: COMMERCIAL

## 2024-03-01 ENCOUNTER — PATIENT MESSAGE (OUTPATIENT)
Dept: GASTROENTEROLOGY | Facility: CLINIC | Age: 24
End: 2024-03-01
Payer: COMMERCIAL

## 2024-03-01 DIAGNOSIS — K58.0 IRRITABLE BOWEL SYNDROME WITH DIARRHEA: Primary | ICD-10-CM

## 2024-03-04 NOTE — TELEPHONE ENCOUNTER
We do not carry samples of Viberzi. We would just recommend her to continue dicyclomine and she can try OTC imodium in the meantime.  OLIVE.

## 2024-03-04 NOTE — TELEPHONE ENCOUNTER
There is another medication called xifaxan for IBS-D however sometimes it is cost prohibitive as well. It is a 14 day course that helps with diarrhea. It looks like she tried to give it in 2022 but insurance had denied it at that time as well. Depending on insurance at that time, that would be a medication that we would consider trying again.  KN

## 2024-03-12 NOTE — TELEPHONE ENCOUNTER
Was the 500 dollars for Xifaxan with insurance AND the savings card? Fiber can help with diarrhea by bulking up stool but it can also cause bloating so I'm not sure if it would be the best option for her. She may try fiber gummies, starting with 2 daily and increase to 4 daily after a few weeks if doing well with 2.   MLC